# Patient Record
Sex: FEMALE | Race: WHITE | Employment: FULL TIME | URBAN - METROPOLITAN AREA
[De-identification: names, ages, dates, MRNs, and addresses within clinical notes are randomized per-mention and may not be internally consistent; named-entity substitution may affect disease eponyms.]

---

## 2017-07-24 ENCOUNTER — OFFICE VISIT (OUTPATIENT)
Dept: INTERNAL MEDICINE CLINIC | Age: 47
End: 2017-07-24

## 2017-07-24 VITALS
BODY MASS INDEX: 37.92 KG/M2 | WEIGHT: 256 LBS | TEMPERATURE: 98.6 F | DIASTOLIC BLOOD PRESSURE: 80 MMHG | SYSTOLIC BLOOD PRESSURE: 126 MMHG | RESPIRATION RATE: 18 BRPM | HEART RATE: 79 BPM | HEIGHT: 69 IN

## 2017-07-24 DIAGNOSIS — J04.0 ACUTE LARYNGITIS: Primary | ICD-10-CM

## 2017-07-24 DIAGNOSIS — J45.20 MILD INTERMITTENT ASTHMA WITHOUT COMPLICATION: ICD-10-CM

## 2017-07-24 DIAGNOSIS — E66.01 MORBID OBESITY DUE TO EXCESS CALORIES (HCC): ICD-10-CM

## 2017-07-24 PROBLEM — M15.9 PRIMARY OSTEOARTHRITIS INVOLVING MULTIPLE JOINTS: Status: ACTIVE | Noted: 2017-07-24

## 2017-07-24 RX ORDER — AZITHROMYCIN 250 MG/1
250 TABLET, FILM COATED ORAL SEE ADMIN INSTRUCTIONS
Qty: 6 TAB | Refills: 0 | Status: SHIPPED | OUTPATIENT
Start: 2017-07-24 | End: 2017-07-29

## 2017-07-24 RX ORDER — CLOBETASOL PROPIONATE 0.5 MG/G
CREAM TOPICAL
Refills: 1 | COMMUNITY
Start: 2017-06-13 | End: 2018-04-20 | Stop reason: SDUPTHER

## 2017-07-24 NOTE — PROGRESS NOTES
Subjective:   Jillian Chavez is a 51 y.o. female      Chief Complaint   Patient presents with    Sore Throat     started yesterday    Cold Symptoms        History of present illness: She presents complaining of some congestion that started about 2-3 days ago she has now developed some hoarseness over the last 24 hours. She denies any shortness of breath and there is minimal cough. She denies any fevers or chills. There are no other cardiovascular complaints other than her ears feel full. She denies any other complaints on complete review of systems. Patient Active Problem List   Diagnosis Code    Morbid obesity ([facility] E66.01    Asthma J45.909    Incontinence R32    Joint pain M25.50    Gastroesophageal reflux disease without esophagitis K21.9    Indigestion K30    Edema of extremities R60.0    Primary osteoarthritis involving multiple joints M15.0    Acute laryngitis J04.0      Past Medical History:   Diagnosis Date    Asthma     Edema of extremities 7/27/2010    Incontinence 7/27/2010    Indigestion 7/27/2010    Joint pain 7/27/2010    Morbid obesity ([facility] 7/27/2010    Reflux 7/27/2010      No Known Allergies   Family History   Problem Relation Age of Onset    Hypertension Mother    Gabe Prior Other Mother 76     aneurysm in her head    High Cholesterol Father     Heart Disease Father       Social History     Social History    Marital status:      Spouse name: N/A    Number of children: N/A    Years of education: N/A     Occupational History    Not on file. Social History Main Topics    Smoking status: Never Smoker    Smokeless tobacco: Never Used    Alcohol use Yes      Comment: socially    Drug use: No    Sexual activity: Yes     Partners: Male     Birth control/ protection: None     Other Topics Concern    Not on file     Social History Narrative     Prior to Admission medications    Medication Sig Start Date End Date Taking?  Authorizing Provider   clobetasol (Kat Meza) 0.05 % topical cream APPLY TOPICALLY BID PRN 6/13/17  Yes Historical Provider   azithromycin (ZITHROMAX) 250 mg tablet Take 1 Tab by mouth See Admin Instructions for 5 days. Take 2 tablets the first day, then 1 tablet daily for the next four days. 7/24/17 7/29/17 Yes Celia Turner MD   cetirizine (ZYRTEC) 10 mg tablet Take  by mouth as needed. 5/29/10  Yes Codey Nina MD   MULTIVITAMINS (MULTI-VITAMIN PO) Take  by mouth daily. 5/29/10  Yes Codey Nina MD   CALCIUM CARBONATE (CALCIUM 300 PO) Take 1 mg by mouth two (2) times a day. 5/29/10  Yes Codey Nina MD   cod liver oil Cap Take 1 Cap by mouth daily. Codey Nina MD        Review of Systems              Constitutional:  She denies fever, weight loss, sweats or fatigue. EYES: No blurred or double vision,               ENT: some nasal congestion, no headache or dizziness. No difficulty with swallowing, taste, speech or smell. Respiratory:  No cough, wheezing or shortness of breath. No sputum production. Cardiac:  Denies chest pain, palpitations, unexplained indigestion, syncope, edema, PND or orthopnea. GI:  No changes in bowel movements, no abdominal pain, no bloating, anorexia, nausea, vomiting or heartburn. :  No frequency or dysuria. Denies incontinence or sexual dysfunction. Extremities:  No joint pain, stiffness or swelling  Back:.no pain or soreness  Skin:  No recent rashes or mole changes. Neurological:  No numbness, tingling, burning paresthesias or loss of motor strength. No syncope, dizziness, frequent headaches or memory loss. Hematologic:  No easy bruising  Lymphatic: No lymph node enlargement    Objective:     Vitals:    07/24/17 1330   BP: 126/80   Pulse: 79   Resp: 18   Temp: 98.6 °F (37 °C)   TempSrc: Oral   Weight: 256 lb (116.1 kg)   Height: 5' 8.5\" (1.74 m)   PainSc:   2   LMP: 07/19/2017       Body mass index is 38.36 kg/(m^2).    Physical Examination:              General Appearance:  Well-developed, well-nourished, no acute  distress. HEENT:      Ears:  The TMs and ear canals were clear. Eyes:  The pupillary responses were normal.  Extraocular muscle function intact. Lids and conjunctiva not injected. Funduscopic exam revealed sharp disc margins. Nares: Clear w/o edema or erythema  Pharynx:  Clear with teeth in good repair. No masses were noted. Notable laryngitis with hoarseness    Neck:  Supple without thyromegaly or adenopathy. No JVD noted. No carotid bruits. Lungs:  Clear to auscultation and percussion. Cardiac:  Regular rate and rhythm without murmur. PMI not displaced. No gallop, rub or click. Abdominal: Soft, non-tender, no hepata-spleenomegally or masses  Extremities:  No clubbing, cyanosis or edema. Skin:  No rash or unusual mole changes noted. Lymph Nodes:  None felt in the cervical, supraclavicular, axillary or inguinal region. Neurological: . DTRs 2+ and symmetric. Station and gait normal.   Hematologic:   No purpura or petechiae        Assessment/Plan:         1. Acute laryngitis    2. Mild intermittent asthma without complication    3. Morbid obesity due to excess calories (HCC)        Impressions/Plan:  Acute upper respiratory infection with associated laryngitis. I do not see any evidence that her asthma is playing a role in this at the present time. I will put on a Z-Haroon to take as directed. She will notify me should he develop any wheezing shortness of breath problem with her asthma. I will recheck her otherwise at her previously scheduled appointment. Follow-up Disposition:  Return if symptoms worsen or fail to improve. No results found for any visits on 07/24/17. Meggan Chavez MD    The patient was given after the visit summary the patient verbalized an understanding of the plans and problems as explained.

## 2017-07-24 NOTE — PROGRESS NOTES
Patient started yesterday with sore throat, laryngitis and cough. Chief Complaint   Patient presents with    Sore Throat     started yesterday    Cold Symptoms     1. Have you been to the ER, urgent care clinic since your last visit? Hospitalized since your last visit? No    2. Have you seen or consulted any other health care providers outside of the [address] [place] [address] since your last visit? Include any pap smears or colon screening. Saw Dr. Jeffrey Lew for f/up of her lab band surgery.

## 2017-07-24 NOTE — MR AVS SNAPSHOT
Visit Information Date & Time Provider Department Dept. Phone Encounter #  
 7/24/2017  1:00 PM Gerri GarciaRajesh [phone] [id number] Upcoming Health Maintenance Date Due Pneumococcal 19-64 Medium Risk (1 of 1 - PPSV23) 8/6/1988 DTaP/Tdap/Td series (1 - Tdap) 8/6/1990 PAP AKA CERVICAL CYTOLOGY 8/6/1990 INFLUENZA AGE 9 TO ADULT 8/1/2017 Allergies as of 7/24/2017  Review Complete On: 7/24/2017 By: Gerri Garcia MD  
 No Known Allergies Current Immunizations  Never Reviewed No immunizations on file. Not reviewed this visit You Were Diagnosed With   
  
 Codes Comments Mild intermittent asthma without complication    -  Primary ICD-10-CM: J45.20 ICD-9-CM: 493.90 Morbid obesity due to excess calories (HCC)     ICD-10-CM: E66.01 
ICD-9-CM: 278.01 Acute laryngitis     ICD-10-CM: J04.0 ICD-9-CM: 464.00 Vitals BP Pulse Temp Resp Height(growth percentile) Weight(growth percentile) 126/80 (BP 1 Location: Left arm, BP Patient Position: Supine) 79 98.6 °F (37 °C) (Oral) 18 5' 8.5\" (1.74 m) 256 lb (116.1 kg) LMP BMI Smoking Status 07/19/2017 (Exact Date) 38.36 kg/m2 Never Smoker BMI and BSA Data Body Mass Index Body Surface Area  
 38.36 kg/m 2 2.37 m 2 Preferred Pharmacy Pharmacy Name Phone [place] [phone], [address] AT [place] [address] [phone] Your Updated Medication List  
  
   
This list is accurate as of: 7/24/17  1:42 PM.  Always use your most recent med list.  
  
  
  
  
 CALCIUM 300 PO Take 1 mg by mouth two (2) times a day. clobetasol 0.05 % topical cream  
Commonly known as:  TEMOVATE  
APPLY TOPICALLY BID PRN  
  
 cod liver oil Cap Take 1 Cap by mouth daily. MULTI-VITAMIN PO Take  by mouth daily. ZyrTEC 10 mg tablet Generic drug:  cetirizine Take  by mouth as needed. Introducing [facility]! Jyoti Love introduces [employer] patient portal. Now you can access parts of your medical record, email your doctor's office, and request medication refills online. 1. In your internet browser, go to [email or web address] 2. Click on the First Time User? Click Here link in the Sign In box. You will see the New Member Sign Up page. 3. Enter your [employer] Access Code exactly as it appears below. You will not need to use this code after youve completed the sign-up process. If you do not sign up before the expiration date, you must request a new code. · [employer] Access Code: [email or web address] Expires: 10/22/2017 12:59 PM 
 
4. Enter the last four digits of your Social Security Number (xxxx) and Date of Birth (mm/dd/yyyy) as indicated and click Submit. You will be taken to the next sign-up page. 5. Create a [employer] ID. This will be your [employer] login ID and cannot be changed, so think of one that is secure and easy to remember. 6. Create a [employer] password. You can change your password at any time. 7. Enter your Password Reset Question and Answer. This can be used at a later time if you forget your password. 8. Enter your e-mail address. You will receive e-mail notification when new information is available in [address] 9. Click Sign Up. You can now view and download portions of your medical record. 10. Click the Download Summary menu link to download a portable copy of your medical information. If you have questions, please visit the Frequently Asked Questions section of the [employer] website. Remember, [employer] is NOT to be used for urgent needs. For medical emergencies, dial 911. Now available from your iPhone and Android! Please provide this summary of care documentation to your next provider. Your primary care clinician is listed as Emilie.  If you have any questions after today's visit, please call [phone].

## 2017-11-30 ENCOUNTER — TELEPHONE (OUTPATIENT)
Dept: INTERNAL MEDICINE CLINIC | Age: 47
End: 2017-11-30

## 2017-11-30 ENCOUNTER — OFFICE VISIT (OUTPATIENT)
Dept: INTERNAL MEDICINE CLINIC | Age: 47
End: 2017-11-30

## 2017-11-30 VITALS
WEIGHT: 258.8 LBS | HEART RATE: 77 BPM | RESPIRATION RATE: 20 BRPM | HEIGHT: 69 IN | SYSTOLIC BLOOD PRESSURE: 110 MMHG | BODY MASS INDEX: 38.33 KG/M2 | OXYGEN SATURATION: 98 % | TEMPERATURE: 98.8 F | DIASTOLIC BLOOD PRESSURE: 74 MMHG

## 2017-11-30 DIAGNOSIS — J20.9 ACUTE BRONCHITIS, UNSPECIFIED ORGANISM: Primary | ICD-10-CM

## 2017-11-30 DIAGNOSIS — J06.9 ACUTE URI: Primary | ICD-10-CM

## 2017-11-30 RX ORDER — AZITHROMYCIN 250 MG/1
250 TABLET, FILM COATED ORAL SEE ADMIN INSTRUCTIONS
Qty: 6 TAB | Refills: 0 | Status: SHIPPED | OUTPATIENT
Start: 2017-11-30 | End: 2017-12-05

## 2017-11-30 NOTE — TELEPHONE ENCOUNTER
Patient called back to request a refill of her inhaler. Says she forgot to ask at the visit today. Reviewed old rx's and patient does have this on her list in the old EMR system. Rx called to patient's pharmacy.

## 2017-11-30 NOTE — PROGRESS NOTES
1. Have you been to the ER, urgent care clinic since your last visit? Hospitalized since your last visit? No    2. Have you seen or consulted any other health care providers outside of the [address] [place] [address] [place] since your last visit? Include any pap smears or colon screening. Yes, [facility], Dr. Vicky Arechiga, on 09- , for nutrition and weight loss. Also saw Dr. Candy Card around [address] [place] [address] for follow up of LAP Band Procedure. Chief Complaint   Patient presents with    Cold Symptoms     cough, congestion     Fasting    Pap ans mammogram were done April 2017 with [facility]. Eye exam was done May 2017 with Dr. Lizzette Alarcon.

## 2017-11-30 NOTE — PROGRESS NOTES
Subjective:   Shyanne Bland is a 49 y.o. female      Chief Complaint   Patient presents with    Cold Symptoms     cough, congestion        History of present illness: She presents complaining a lot of head and nasal congestion she started with earache and now notes a lot of postnasal drainage and resultant cough. She is noted no fevers or chills she does have some joint aches but no severe myalgias. There have been as noted no fevers or chills. She has no other complaints on complete review. Patient Active Problem List   Diagnosis Code    Morbid obesity ([facility] E66.01    Asthma J45.909    Incontinence R32    Joint pain M25.50    Gastroesophageal reflux disease without esophagitis K21.9    Indigestion K30    Edema of extremities R60.0    Primary osteoarthritis involving multiple joints M15.0    Acute laryngitis J04.0    Acute URI J06.9      Past Medical History:   Diagnosis Date    Asthma     Edema of extremities 7/27/2010    Incontinence 7/27/2010    Indigestion 7/27/2010    Joint pain 7/27/2010    Morbid obesity ([facility] 7/27/2010    Reflux 7/27/2010      No Known Allergies   Family History   Problem Relation Age of Onset    Hypertension Mother    [place] Other Mother 76     aneurysm in her head    High Cholesterol Father     Heart Disease Father       Social History     Social History    Marital status:      Spouse name: N/A    Number of children: N/A    Years of education: N/A     Occupational History    Not on file. Social History Main Topics    Smoking status: Never Smoker    Smokeless tobacco: Never Used    Alcohol use Yes      Comment: socially    Drug use: No    Sexual activity: Yes     Partners: Male     Birth control/ protection: None     Other Topics Concern    Not on file     Social History Narrative     Prior to Admission medications    Medication Sig Start Date End Date Taking?  Authorizing Provider   azithromycin (ZITHROMAX) 250 mg tablet Take 1 Tab by mouth See Admin Instructions for 5 days. Take 2 tablets the first day, then 1 tablet daily for the next four days. 11/30/17 12/5/17 Yes Delmi Lin MD   clobetasol (TEMOVATE) 0.05 % topical cream APPLY TOPICALLY BID PRN 6/13/17  Yes Historical Provider   cetirizine (ZYRTEC) 10 mg tablet Take  by mouth as needed. 5/29/10  Yes Phys Other, MD   MULTIVITAMINS (MULTI-VITAMIN PO) Take  by mouth daily. 5/29/10  Yes Phys Other, MD        Review of Systems              Constitutional:  She denies fever, weight loss, sweats or fatigue. EYES: No blurred or double vision,               ENT: Positive ahead and nasal congestion, no headache or dizziness. No difficulty with               swallowing, taste, speech or smell. Respiratory: Positive for postnasal drainage with cough without wheezing or shortness of breath. No sputum production. Cardiac:  Denies chest pain, palpitations, unexplained indigestion, syncope, edema, PND or orthopnea. GI:  No changes in bowel movements, no abdominal pain, no bloating, anorexia, nausea, vomiting or heartburn. :  No frequency or dysuria. Denies incontinence or sexual dysfunction. Extremities:  No joint pain, stiffness or swelling  Back:.no pain or soreness  Skin:  No recent rashes or mole changes. Neurological:  No numbness, tingling, burning paresthesias or loss of motor strength. No syncope, dizziness, frequent headaches or memory loss. Hematologic:  No easy bruising  Lymphatic: No lymph node enlargement    Objective:     Vitals:    11/30/17 1359   BP: 110/74   Pulse: 77   Resp: 20   Temp: 98.8 °F (37.1 °C)   TempSrc: Oral   SpO2: 98%   Weight: 258 lb 12.8 oz (117.4 kg)   Height: 5' 8.5\" (1.74 m)   PainSc:   0 - No pain       Body mass index is 38.78 kg/(m^2). Physical Examination:              General Appearance:  Well-developed, well-nourished, no acute distress. HEENT:      Ears:  The TMs and ear canals were clear.   Eyes:  The pupillary responses were normal.  Extraocular muscle function intact. Lids and conjunctiva not injected. Funduscopic exam revealed sharp disc margins. Nares: Inflamed and edematous  Pharynx:  Clear with teeth in good repair. No masses were noted. Neck:  Supple without thyromegaly or adenopathy. No JVD noted. No carotid                bruits. Lungs:  Clear to auscultation and percussion. Cardiac:  Regular rate and rhythm without murmur. PMI not displaced. No gallop, rub or click. Abdominal: Soft, non-tender, no hepata-spleenomegally or masses  Extremities:  No clubbing, cyanosis or edema. Skin:  No rash or unusual mole changes noted. Lymph Nodes:  None felt in the cervical, supraclavicular, axillary or inguinal region. Neurological: . DTRs 2+ and symmetric. Station and gait normal.   Hematologic:   No purpura or petechiae        Assessment/Plan:         1. Acute URI        Impressions/Plan:  Acute URI/sinusitis we will treat this with a Z-Haroon to take as directed she will notify me if it is not effective. Orders Placed This Encounter    azithromycin (ZITHROMAX) 250 mg tablet       Follow-up Disposition:  Return if symptoms worsen or fail to improve. No results found for any visits on 11/30/17. Britni Pryor MD    The patient was given after the visit summary the patient verbalized an understanding of the plans and problems as explained.

## 2017-11-30 NOTE — TELEPHONE ENCOUNTER
Requested Prescriptions     Pending Prescriptions Disp Refills    albuterol (PROVENTIL HFA, VENTOLIN HFA, PROAIR HFA) 90 mcg/actuation inhaler 1 Inhaler 1     Sig: Take 2 Puffs by inhalation every six (6) hours as needed for Wheezing.

## 2017-11-30 NOTE — MR AVS SNAPSHOT
Visit Information Date & Time Provider Department Dept. Phone Encounter #  
 11/30/2017  1:50 PM Salas Garcia MD [facility] [id number] Follow-up Instructions Return if symptoms worsen or fail to improve. Follow-up and Disposition History Your Appointments 4/6/2018  8:00 AM  
Follow Up with MD Adams Sarmientocharisma [address] ([facility]) Appt Note: [place] [address] [phone] [address] [facility] [phone] Upcoming Health Maintenance Date Due Pneumococcal 19-64 Medium Risk (1 of 1 - PPSV23) 8/6/1988 DTaP/Tdap/Td series (1 - Tdap) 8/6/1990 PAP AKA CERVICAL CYTOLOGY 8/6/1990 BREAST CANCER SCRN MAMMOGRAM 1/29/2017 Allergies as of 11/30/2017  Review Complete On: 11/30/2017 By: Salas Garcia MD  
 No Known Allergies Current Immunizations  Never Reviewed No immunizations on file. Not reviewed this visit You Were Diagnosed With   
  
 Codes Comments Acute URI    -  Primary ICD-10-CM: J06.9 ICD-9-CM: 465.9 Vitals BP Pulse Temp Resp Height(growth percentile) Weight(growth percentile) 110/74 (BP 1 Location: Left arm, BP Patient Position: Sitting) 77 98.8 °F (37.1 °C) (Oral) 20 5' 8.5\" (1.74 m) 258 lb 12.8 oz (117.4 kg) SpO2 BMI OB Status Smoking Status 98% 38.78 kg/m2 Premenopausal Never Smoker Vitals History BMI and BSA Data Body Mass Index Body Surface Area 38.78 kg/m 2 2.38 m 2 Preferred Pharmacy Pharmacy Name Phone [phone], [address] AT [phone] Your Updated Medication List  
  
   
This list is accurate as of: 11/30/17  2:49 PM.  Always use your most recent med list.  
  
  
  
  
 azithromycin 250 mg tablet Commonly known as:  Hedwig Pine Take 1 Tab by mouth See Admin Instructions for 5 days. Take 2 tablets the first day, then 1 tablet daily for the next four days. clobetasol 0.05 % topical cream  
Commonly known as:  TEMOVATE  
APPLY TOPICALLY BID PRN  
  
 MULTI-VITAMIN PO Take  by mouth daily. ZyrTEC 10 mg tablet Generic drug:  cetirizine Take  by mouth as needed. Prescriptions Sent to Pharmacy Refills  
 azithromycin (ZITHROMAX) 250 mg tablet 0 Sig: Take 1 Tab by mouth See Admin Instructions for 5 days. Take 2 tablets the first day, then 1 tablet daily for the next four days. Class: Normal  
 Pharmacy: [facility] [address] [place] [address] #: [phone] Route: Oral  
  
Follow-up Instructions Return if symptoms worsen or fail to improve. Introducing [facility]! [facility] introduces [employer] patient portal. Now you can access parts of your medical record, email your doctor's office, and request medication refills online. 1. In your internet browser, go to [email or web address] 2. Click on the First Time User? Click Here link in the Sign In box. You will see the New Member Sign Up page. 3. Enter your [employer] Access Code exactly as it appears below. You will not need to use this code after youve completed the sign-up process. If you do not sign up before the expiration date, you must request a new code. · [employer] Access Code: [email or web address] Expires: 2/28/2018  1:52 PM 
 
4. Enter the last four digits of your Social Security Number (xxxx) and Date of Birth (mm/dd/yyyy) as indicated and click Submit. You will be taken to the next sign-up page. 5. Create a [employer] ID. This will be your [employer] login ID and cannot be changed, so think of one that is secure and easy to remember. 6. Create a [employer] password. You can change your password at any time. 7. Enter your Password Reset Question and Answer. This can be used at a later time if you forget your password. 8. Enter your e-mail address. You will receive e-mail notification when new information is available in [address] 9. Click Sign Up. You can now view and download portions of your medical record. 10. Click the Download Summary menu link to download a portable copy of your medical information. If you have questions, please visit the Frequently Asked Questions section of the [employer] website. Remember, [employer] is NOT to be used for urgent needs. For medical emergencies, dial 911. Now available from your iPhone and Android! Please provide this summary of care documentation to your next provider. Your primary care clinician is listed as Emilie. If you have any questions after today's visit, please call [phone].

## 2017-12-01 RX ORDER — ALBUTEROL SULFATE 90 UG/1
2 AEROSOL, METERED RESPIRATORY (INHALATION)
Qty: 1 INHALER | Refills: 1 | Status: SHIPPED | OUTPATIENT
Start: 2017-12-01 | End: 2019-01-02 | Stop reason: ALTCHOICE

## 2018-03-05 ENCOUNTER — OFFICE VISIT (OUTPATIENT)
Dept: INTERNAL MEDICINE CLINIC | Age: 48
End: 2018-03-05

## 2018-03-05 VITALS
TEMPERATURE: 98.6 F | RESPIRATION RATE: 18 BRPM | DIASTOLIC BLOOD PRESSURE: 82 MMHG | HEART RATE: 81 BPM | WEIGHT: 265.2 LBS | OXYGEN SATURATION: 98 % | SYSTOLIC BLOOD PRESSURE: 139 MMHG | BODY MASS INDEX: 39.28 KG/M2 | HEIGHT: 69 IN

## 2018-03-05 DIAGNOSIS — J01.00 ACUTE NON-RECURRENT MAXILLARY SINUSITIS: Primary | ICD-10-CM

## 2018-03-05 DIAGNOSIS — J45.20 MILD INTERMITTENT ASTHMA WITHOUT COMPLICATION: ICD-10-CM

## 2018-03-05 DIAGNOSIS — E66.01 MORBID OBESITY (HCC): ICD-10-CM

## 2018-03-05 RX ORDER — GUAIFENESIN 600 MG/1
600 TABLET, EXTENDED RELEASE ORAL
COMMUNITY
End: 2018-04-06 | Stop reason: ALTCHOICE

## 2018-03-05 RX ORDER — CEFUROXIME AXETIL 250 MG/1
250 TABLET ORAL 2 TIMES DAILY
Qty: 20 TAB | Refills: 0 | Status: SHIPPED | OUTPATIENT
Start: 2018-03-05 | End: 2018-04-06 | Stop reason: ALTCHOICE

## 2018-03-05 NOTE — PROGRESS NOTES
Chief Complaint   Patient presents with    Cold Symptoms     Nasal congestion clear to yellow in color, Symptoms started on Friday    Cough     Productive cough    Headache     headache & sinus pressure     Visit Vitals    /82 (BP 1 Location: Left arm, BP Patient Position: Sitting)    Pulse 81    Temp 98.6 °F (37 °C) (Oral)    Resp 18    Ht 5' 8.5\" (1.74 m)    Wt 265 lb 3.2 oz (120.3 kg)    SpO2 98%    BMI 39.74 kg/m2     1. Have you been to the ER, urgent care clinic since your last visit? Hospitalized since your last visit? No    2. Have you seen or consulted any other health care providers outside of the [address] [place] [address] since your last visit? Include any pap smears or colon screening.  No

## 2018-03-05 NOTE — PATIENT INSTRUCTIONS
Sinusitis: Care Instructions  Your Care Instructions    Sinusitis is an infection of the lining of the sinus cavities in your head. Sinusitis often follows a cold. It causes pain and pressure in your head and face. In most cases, sinusitis gets better on its own in 1 to 2 weeks. But some mild symptoms may last for several weeks. Sometimes antibiotics are needed. Follow-up care is a key part of your treatment and safety. Be sure to make and go to all appointments, and call your doctor if you are having problems. It's also a good idea to know your test results and keep a list of the medicines you take. How can you care for yourself at home? · Take an over-the-counter pain medicine, such as acetaminophen (Tylenol), ibuprofen (Advil, Motrin), or naproxen (Aleve). Read and follow all instructions on the label. · If the doctor prescribed antibiotics, take them as directed. Do not stop taking them just because you feel better. You need to take the full course of antibiotics. · Be careful when taking over-the-counter cold or flu medicines and Tylenol at the same time. Many of these medicines have acetaminophen, which is Tylenol. Read the labels to make sure that you are not taking more than the recommended dose. Too much acetaminophen (Tylenol) can be harmful. · Breathe warm, moist air from a steamy shower, a hot bath, or a sink filled with hot water. Avoid cold, dry air. Using a humidifier in your home may help. Follow the directions for cleaning the machine. · Use saline (saltwater) nasal washes to help keep your nasal passages open and wash out mucus and bacteria. You can buy saline nose drops at a grocery store or drugstore. Or you can make your own at home by adding 1 teaspoon of salt and 1 teaspoon of baking soda to 2 cups of distilled water. If you make your own, fill a bulb syringe with the solution, insert the tip into your nostril, and squeeze gently. [place] your nose.   · Put a hot, wet towel or a warm gel pack on your face 3 or 4 times a day for 5 to 10 minutes each time. · Try a decongestant nasal spray like oxymetazoline (Afrin). Do not use it for more than 3 days in a row. Using it for more than 3 days can make your congestion worse. When should you call for help? Call your doctor now or seek immediate medical care if:  ? · You have new or worse swelling or redness in your face or around your eyes. ? · You have a new or higher fever. ? Watch closely for changes in your health, and be sure to contact your doctor if:  ? · You have new or worse facial pain. ? · The mucus from your nose becomes thicker (like pus) or has new blood in it. ? · You are not getting better as expected. Where can you learn more? Go to http://ravindra-bobby.info/. Enter I596 in the search box to learn more about \"Sinusitis: Care Instructions. \"  Current as of: May 12, 2017  Content Version: 11.4  © 9426-9925 [employer]. Care instructions adapted under license by [employer] (which disclaims liability or warranty for this information). If you have questions about a medical condition or this instruction, always ask your healthcare professional. [address] any warranty or liability for your use of this information.

## 2018-03-05 NOTE — PROGRESS NOTES
Subjective:   Lana Zabala is a 49 y.o. female      Chief Complaint   Patient presents with    Cold Symptoms     Nasal congestion clear to yellow in color, Symptoms started on Friday    Cough     Productive cough    Headache     headache & sinus pressure        History of present illness: She presents complaint a lot of sinus congestion or pressure around the eyes particularly on the left side. Been present now for about 3-4 days. She notes no fevers or chills. She notes no shortness of breath or exacerbation of asthma. She has no other complaints. Patient Active Problem List   Diagnosis Code    Morbid obesity ([facility] [address] E66.01    Asthma J45.909    Incontinence R32    Joint pain M25.50    Gastroesophageal reflux disease without esophagitis K21.9    Indigestion K30    Edema of extremities R60.0    Primary osteoarthritis involving multiple joints M15.0    Acute laryngitis J04.0    Acute URI J06.9    Acute non-recurrent maxillary sinusitis J01.00      Past Medical History:   Diagnosis Date    Asthma     Edema of extremities 7/27/2010    Incontinence 7/27/2010    Indigestion 7/27/2010    Joint pain 7/27/2010    Morbid obesity ([facility] [address] 7/27/2010    Reflux 7/27/2010      No Known Allergies   Family History   Problem Relation Age of Onset    Hypertension Mother    Stacey Morrison Other Mother 76     aneurysm in her head    High Cholesterol Father     Heart Disease Father       Social History     Social History    Marital status:      Spouse name: N/A    Number of children: N/A    Years of education: N/A     Occupational History    Not on file.      Social History Main Topics    Smoking status: Never Smoker    Smokeless tobacco: Never Used    Alcohol use Yes      Comment: socially    Drug use: No    Sexual activity: Yes     Partners: Male     Birth control/ protection: None     Other Topics Concern    Not on file     Social History Narrative     Prior to Admission medications    Medication Sig Start Date End Date Taking? Authorizing Provider   PSEUDOEPHEDRINE HCL (WAL-PHED D PO) Take  by mouth. Yes Historical Provider   guaiFENesin ER (MUCINEX) 600 mg ER tablet Take 600 mg by mouth two (2) times a day. Yes Historical Provider   cefUROXime (CEFTIN) 250 mg tablet Take 1 Tab by mouth two (2) times a day. 3/5/18  Yes Fitz Frey MD   albuterol (PROVENTIL HFA, VENTOLIN HFA, PROAIR HFA) 90 mcg/actuation inhaler Take 2 Puffs by inhalation every six (6) hours as needed for Wheezing. 12/1/17  Yes Fitz Frey MD   clobetasol (TEMOVATE) 0.05 % topical cream APPLY TOPICALLY BID PRN 6/13/17  Yes Historical Provider   cetirizine (ZYRTEC) 10 mg tablet Take  by mouth as needed. 5/29/10  Yes Codey Nina MD   MULTIVITAMINS (MULTI-VITAMIN PO) Take  by mouth daily. 5/29/10  Yes Codey Nina MD        Review of Systems              Constitutional:  She denies fever, weight loss, sweats or fatigue. EYES: No blurred or double vision,               ENT: Positive ahead and nasal congestion, no headache or dizziness. No difficulty with               swallowing, taste, speech or smell. Respiratory:  No cough, wheezing or shortness of breath. No sputum production. Cardiac:  Denies chest pain, palpitations, unexplained indigestion, syncope, edema, PND or orthopnea. GI:  No changes in bowel movements, no abdominal pain, no bloating, anorexia, nausea, vomiting or heartburn. :  No frequency or dysuria. Denies incontinence or sexual dysfunction. Extremities:  No joint pain, stiffness or swelling  Back:.no pain or soreness  Skin:  No recent rashes or mole changes. Neurological:  No numbness, tingling, burning paresthesias or loss of motor strength. No syncope, dizziness, frequent headaches or memory loss.   Hematologic:  No easy bruising  Lymphatic: No lymph node enlargement    Objective:     Vitals:    03/05/18 1549   BP: 139/82   Pulse: 81   Resp: 18   Temp: 98.6 °F (37 °C)   TempSrc: Oral SpO2: 98%   Weight: 265 lb 3.2 oz (120.3 kg)   Height: 5' 8.5\" (1.74 m)   PainSc:   0 - No pain   LMP: 02/13/2018       Body mass index is 39.74 kg/(m^2). Physical Examination:              General Appearance:  Well-developed, well-nourished, no acute distress. HEENT:      Ears:  The TMs and ear canals were clear. Eyes:  The pupillary responses were normal.  Extraocular muscle function intact. Lids and conjunctiva not injected. Funduscopic exam revealed sharp disc margins. Nares: Nares inflamed and edematous  Pharynx:  Clear with teeth in good repair. No masses were noted. Neck:  Supple without thyromegaly or adenopathy. No JVD noted. No carotid                bruits. Lungs:  Clear to auscultation and percussion. Cardiac:  Regular rate and rhythm without murmur. PMI not displaced. No gallop, rub or click. Abdominal: Soft, non-tender, no hepata-spleenomegally or masses  Extremities:  No clubbing, cyanosis or edema. Skin:  No rash or unusual mole changes noted. Lymph Nodes:  None felt in the cervical, supraclavicular, axillary or inguinal region. Neurological: . DTRs 2+ and symmetric. Station and gait normal.   Hematologic:   No purpura or petechiae        Assessment/Plan:         1. Acute non-recurrent maxillary sinusitis    2. Mild intermittent asthma without complication    3. Morbid obesity ([address]        Impressions/Plan:  Impression  1. Acute sinusitis we will treat this with Ceftin twice a day for 10 days  2. Asthma does not seem to be affected by this at the present time  3. Morbid obesity discussed diet exercise weight reduction  Recheck as previously scheduled    Orders Placed This Encounter    PSEUDOEPHEDRINE HCL (WAL-PHED D PO)    guaiFENesin ER (MUCINEX) 600 mg ER tablet    cefUROXime (CEFTIN) 250 mg tablet       Follow-up Disposition:  Return for At previously scheduled appointment. No results found for any visits on 03/05/18.       Chad Brunner MD    The patient was given after the visit summary the patient verbalized an understanding of the plans and problems as explained.

## 2018-04-05 PROBLEM — J45.20 MILD INTERMITTENT ASTHMA WITHOUT COMPLICATION: Status: ACTIVE | Noted: 2018-04-05

## 2018-04-06 ENCOUNTER — OFFICE VISIT (OUTPATIENT)
Dept: INTERNAL MEDICINE CLINIC | Age: 48
End: 2018-04-06

## 2018-04-06 VITALS
BODY MASS INDEX: 39.55 KG/M2 | DIASTOLIC BLOOD PRESSURE: 82 MMHG | WEIGHT: 267 LBS | SYSTOLIC BLOOD PRESSURE: 120 MMHG | OXYGEN SATURATION: 98 % | HEIGHT: 69 IN | TEMPERATURE: 98.5 F | RESPIRATION RATE: 16 BRPM | HEART RATE: 62 BPM

## 2018-04-06 DIAGNOSIS — E66.01 MORBID OBESITY (HCC): ICD-10-CM

## 2018-04-06 DIAGNOSIS — J45.20 MILD INTERMITTENT ASTHMA WITHOUT COMPLICATION: ICD-10-CM

## 2018-04-06 DIAGNOSIS — K21.9 GASTROESOPHAGEAL REFLUX DISEASE WITHOUT ESOPHAGITIS: ICD-10-CM

## 2018-04-06 DIAGNOSIS — Z00.00 ANNUAL PHYSICAL EXAM: Primary | ICD-10-CM

## 2018-04-06 DIAGNOSIS — M15.9 PRIMARY OSTEOARTHRITIS INVOLVING MULTIPLE JOINTS: ICD-10-CM

## 2018-04-06 DIAGNOSIS — G47.9 SLEEP DISTURBANCE: ICD-10-CM

## 2018-04-06 LAB
ALBUMIN SERPL-MCNC: 4.1 G/DL (ref 3.9–5.4)
ALKALINE PHOS POC: 63 U/L (ref 38–126)
ALT SERPL-CCNC: 28 U/L (ref 9–52)
AST SERPL-CCNC: 19 U/L (ref 14–36)
BUN BLD-MCNC: 12 MG/DL (ref 7–17)
CALCIUM BLD-MCNC: 9.2 MG/DL (ref 8.4–10.2)
CHLORIDE BLD-SCNC: 103 MMOL/L (ref 98–107)
CO2 POC: 27 MMOL/L (ref 22–32)
CREAT BLD-MCNC: 0.8 MG/DL (ref 0.7–1.2)
EGFR (POC): 87.2
GLUCOSE POC: 92 MG/DL (ref 65–105)
POTASSIUM SERPL-SCNC: 4.1 MMOL/L (ref 3.6–5)
PROT SERPL-MCNC: 7 G/DL (ref 6.3–8.2)
SODIUM SERPL-SCNC: 139 MMOL/L (ref 137–145)
TOTAL BILIRUBIN POC: 1 MG/DL (ref 0.2–1.3)

## 2018-04-06 NOTE — MR AVS SNAPSHOT
[facility], [place], operated by [facility] 
 
 
 [place] [address] [phone] Patient: Ford Garza MRN: [id number] :1970 Visit Information Date & Time Provider Department Dept. Phone Encounter #  
 2018  8:00 AM Meggan Chavez MD [place] [address] [phone] [id number] Follow-up Instructions Return in about 6 months (around 10/6/2018). Your Appointments 5/3/2019  8:00 AM  
FOLLOW UP 10 with MD ALVINO Cortes [facility] ([address]) Appt Note: follow up yearly; follow up yearly [place] [id number] [address] [facility] [address] Upcoming Health Maintenance Date Due Pneumococcal 19-64 Medium Risk (1 of 1 - PPSV23) 1988 DTaP/Tdap/Td series (1 - Tdap) 1990 PAP AKA CERVICAL CYTOLOGY 1990 BREAST CANCER SCRN MAMMOGRAM 2017 Allergies as of 2018  Review Complete On: 2018 By: Meggan Chavez MD  
 No Known Allergies Current Immunizations  Never Reviewed No immunizations on file. Not reviewed this visit You Were Diagnosed With   
  
 Codes Comments Annual physical exam    -  Primary ICD-10-CM: Z00.00 ICD-9-CM: V70.0 Gastroesophageal reflux disease without esophagitis     ICD-10-CM: K21.9 ICD-9-CM: 530.81 Morbid obesity ([facility] [address]     ICD-10-CM: E66.01 
ICD-9-CM: 278.01 Primary osteoarthritis involving multiple joints     ICD-10-CM: M15.0 ICD-9-CM: 715.09 Mild intermittent asthma without complication     [facility]-77-JA: J45.20 ICD-9-CM: 493.90 Sleep disturbance     ICD-10-CM: G47.9 ICD-9-CM: 780.50 Vitals BP Pulse Temp Resp Height(growth percentile) Weight(growth percentile)  120/82 (BP 1 Location: Left arm, BP Patient Position: Sitting) 62 98.5 °F (36.9 °C) (Oral) 16 5' 8.5\" (1.74 m) 267 lb (121.1 kg) LMP SpO2 BMI OB Status Smoking Status 04/04/2018 98% 40.01 kg/m2 Premenopausal Never Smoker Vitals History BMI and BSA Data Body Mass Index Body Surface Area 40.01 kg/m 2 2.42 m 2 Preferred Pharmacy Pharmacy Name Phone [phone], [address] AT [place] [address] [phone] Your Updated Medication List  
  
   
This list is accurate as of 4/6/18  8:55 AM.  Always use your most recent med list.  
  
  
  
  
 albuterol 90 mcg/actuation inhaler Commonly known as:  PROVENTIL HFA, VENTOLIN HFA, PROAIR HFA Take 2 Puffs by inhalation every six (6) hours as needed for Wheezing. clobetasol 0.05 % topical cream  
Commonly known as:  TEMOVATE  
APPLY TOPICALLY BID PRN  
  
 MULTI-VITAMIN PO Take  by mouth daily. ZyrTEC 10 mg tablet Generic drug:  cetirizine Take  by mouth as needed. We Performed the Following AMB POC COMPLETE CBC,AUTOMATED ENTER [id number] CPT(R)] AMB POC COMPREHENSIVE METABOLIC PANEL [47905 CPT(R)] AMB POC LIPID PROFILE [57271 CPT(R)] AMB POC T4, FREE [id number] CPT(R)] AMB POC TSH [18791 CPT(R)] AMB POC URINALYSIS DIP STICK AUTO W/ MICRO  [00285 CPT(R)] REFERRAL TO SLEEP STUDIES [REF99 Custom] Comments:  
 Accusom home sleep test  
  
Follow-up Instructions Return in about 6 months (around 10/6/2018). Referral Information Referral ID Referred By Referred To  
  
 [id number] Espinoza Chavez Not Available Visits Status Start Date End Date 1 New Request 4/6/18 4/6/19 If your referral has a status of pending review or denied, additional information will be sent to support the outcome of this decision. Patient Instructions Asthma Attack: Care Instructions Your Care Instructions During an asthma attack, the airways swell and narrow. This makes it hard to breathe. Severe asthma attacks can be life-threatening, but you can help prevent them by keeping your asthma under control and treating symptoms before they get bad. Symptoms include being short of breath, having chest tightness, coughing, and wheezing. Noting and treating these symptoms can also help you avoid future trips to the emergency room. The doctor has checked you carefully, but problems can develop later. If you notice any problems or new symptoms, get medical treatment right away. Follow-up care is a key part of your treatment and safety. Be sure to make and go to all appointments, and call your doctor if you are having problems. It's also a good idea to know your test results and keep a list of the medicines you take. How can you care for yourself at home? · Follow your asthma action plan to prevent and treat attacks. If you don't have an asthma action plan, work with your doctor to create one. · Take your asthma medicines exactly as prescribed. Talk to your doctor right away if you have any questions about how to take them. ¨ Use your quick-relief medicine when you have symptoms of an attack. Quick-relief medicine is usually an albuterol inhaler. Some people need to use quick-relief medicine before they exercise. ¨ Take your controller medicine every day, not just when you have symptoms. Controller medicine is usually an inhaled corticosteroid. The goal is to prevent problems before they occur. Don't use your controller medicine to treat an attack that has already started. It doesn't work fast enough to help. ¨ If your doctor prescribed corticosteroid pills to use during an attack, take them exactly as prescribed. It may take hours for the pills to work, but they may make the episode shorter and help you breathe better. ¨ Keep your quick-relief medicine with you at all times. · Talk to your doctor before using other medicines. Some medicines, such as aspirin, can cause asthma attacks in some people. · If you have a peak flow meter, use it to check how well you are breathing. This can help you predict when an asthma attack is going to occur. Then you can take medicine to prevent the asthma attack or make it less severe. · Do not smoke or allow others to smoke around you. Avoid smoky places. Smoking makes asthma worse. If you need help quitting, talk to your doctor about stop-smoking programs and medicines. These can increase your chances of quitting for good. · Learn what triggers an asthma attack for you, and avoid the triggers when you can. Common triggers include colds, smoke, air pollution, dust, pollen, mold, pets, cockroaches, stress, and cold air. · Avoid colds and the flu. Get a pneumococcal vaccine shot. If you have had one before, ask your doctor if you need a second dose. Get a flu vaccine every fall. If you must be around people with colds or the flu, wash your hands often. When should you call for help? Call 911 anytime you think you may need emergency care. For example, call if: 
? · You have severe trouble breathing. ?Call your doctor now or seek immediate medical care if: 
? · Your symptoms do not get better after you have followed your asthma action plan. ? · You have new or worse trouble breathing. ? · Your coughing and wheezing get worse. ? · You cough up dark brown or bloody mucus (sputum). ? · You have a new or higher fever. ? Watch closely for changes in your health, and be sure to contact your doctor if: 
? · You need to use quick-relief medicine on more than 2 days a week (unless it is just for exercise). ? · You cough more deeply or more often, especially if you notice more mucus or a change in the color of your mucus. ? · You are not getting better as expected. Where can you learn more? Go to http://ravindra-bobby.info/. Enter U946 in the search box to learn more about \"Asthma Attack: Care Instructions. \" Current as of: May 12, 2017 Content Version: 11.4 © 9287-6167 [employer]wise[employer]. Care instructions adapted under license by [employer] (which disclaims liability or warranty for this information). If you have questions about a medical condition or this instruction, always ask your healthcare professional. [address] any warranty or liability for your use of this information. Introducing [facility]! [employer] introduces Industry Dive patient portal. Now you can access parts of your medical record, email your doctor's office, and request medication refills online. 1. In your internet browser, go to [email or web address] 2. Click on the First Time User? Click Here link in the Sign In box. You will see the New Member Sign Up page. 3. Enter your Industry Dive Access Code exactly as it appears below. You will not need to use this code after youve completed the sign-up process. If you do not sign up before the expiration date, you must request a new code. · Industry Dive Access Code: [email or web address] Expires: 6/3/2018  4:52 PM 
 
4. Enter the last four digits of your Social Security Number (xxxx) and Date of Birth (mm/dd/yyyy) as indicated and click Submit. You will be taken to the next sign-up page. 5. Create a Industry Dive ID. This will be your Industry Dive login ID and cannot be changed, so think of one that is secure and easy to remember. 6. Create a Industry Dive password. You can change your password at any time. 7. Enter your Password Reset Question and Answer. This can be used at a later time if you forget your password. 8. Enter your e-mail address. You will receive e-mail notification when new information is available in [address] 9. Click Sign Up. You can now view and download portions of your medical record. 10. Click the Download Summary menu link to download a portable copy of your medical information. If you have questions, please visit the Frequently Asked Questions section of the Blogic website. Remember, Blogic is NOT to be used for urgent needs. For medical emergencies, dial 911. Now available from your iPhone and Android! Please provide this summary of care documentation to your next provider. Your primary care clinician is listed as Emilie. If you have any questions after today's visit, please call [phone].

## 2018-04-06 NOTE — PROGRESS NOTES
1. Have you been to the ER, urgent care clinic since your last visit? Hospitalized since your last visit? No    2. Have you seen or consulted any other health care providers outside of the [facility] since your last visit? Include any pap smears or colon screening. No       Chief Complaint   Patient presents with    Annual Wellness Visit       Pap and Mammogram due in May, 2018.     Fasting

## 2018-04-06 NOTE — PATIENT INSTRUCTIONS
Asthma Attack: Care Instructions  Your Care Instructions    During an asthma attack, the airways swell and narrow. This makes it hard to breathe. Severe asthma attacks can be life-threatening, but you can help prevent them by keeping your asthma under control and treating symptoms before they get bad. Symptoms include being short of breath, having chest tightness, coughing, and wheezing. Noting and treating these symptoms can also help you avoid future trips to the emergency room. The doctor has checked you carefully, but problems can develop later. If you notice any problems or new symptoms, get medical treatment right away. Follow-up care is a key part of your treatment and safety. Be sure to make and go to all appointments, and call your doctor if you are having problems. It's also a good idea to know your test results and keep a list of the medicines you take. How can you care for yourself at home? · Follow your asthma action plan to prevent and treat attacks. If you don't have an asthma action plan, work with your doctor to create one. · Take your asthma medicines exactly as prescribed. Talk to your doctor right away if you have any questions about how to take them. ¨ Use your quick-relief medicine when you have symptoms of an attack. Quick-relief medicine is usually an albuterol inhaler. Some people need to use quick-relief medicine before they exercise. ¨ Take your controller medicine every day, not just when you have symptoms. Controller medicine is usually an inhaled corticosteroid. The goal is to prevent problems before they occur. Don't use your controller medicine to treat an attack that has already started. It doesn't work fast enough to help. ¨ If your doctor prescribed corticosteroid pills to use during an attack, take them exactly as prescribed. It may take hours for the pills to work, but they may make the episode shorter and help you breathe better.   ¨ Keep your quick-relief medicine with you at all times. · Talk to your doctor before using other medicines. Some medicines, such as aspirin, can cause asthma attacks in some people. · If you have a peak flow meter, use it to check how well you are breathing. This can help you predict when an asthma attack is going to occur. Then you can take medicine to prevent the asthma attack or make it less severe. · Do not smoke or allow others to smoke around you. Avoid smoky places. Smoking makes asthma worse. If you need help quitting, talk to your doctor about stop-smoking programs and medicines. These can increase your chances of quitting for good. · Learn what triggers an asthma attack for you, and avoid the triggers when you can. Common triggers include colds, smoke, air pollution, dust, pollen, mold, pets, cockroaches, stress, and cold air. · Avoid colds and the flu. Get a pneumococcal vaccine shot. If you have had one before, ask your doctor if you need a second dose. Get a flu vaccine every fall. If you must be around people with colds or the flu, wash your hands often. When should you call for help? Call 911 anytime you think you may need emergency care. For example, call if:  ? · You have severe trouble breathing. ?Call your doctor now or seek immediate medical care if:  ? · Your symptoms do not get better after you have followed your asthma action plan. ? · You have new or worse trouble breathing. ? · Your coughing and wheezing get worse. ? · You cough up dark brown or bloody mucus (sputum). ? · You have a new or higher fever. ? Watch closely for changes in your health, and be sure to contact your doctor if:  ? · You need to use quick-relief medicine on more than 2 days a week (unless it is just for exercise). ? · You cough more deeply or more often, especially if you notice more mucus or a change in the color of your mucus. ? · You are not getting better as expected. Where can you learn more?   Go to http://ravindra-bobby.info/. Enter D762 in the search box to learn more about \"Asthma Attack: Care Instructions. \"  Current as of: May 12, 2017  Content Version: 11.4  © 2655-2436 Healthwise, [employer]. Care instructions adapted under license by [employer] (which disclaims liability or warranty for this information). If you have questions about a medical condition or this instruction, always ask your healthcare professional. [place] [address] any warranty or liability for your use of this information.

## 2018-04-06 NOTE — PROGRESS NOTES
Annual Wellness Visit       HPI:     Jillian Chavez is a 49y.o. year old female who is here for her annual comprehensive healthcare exam and follow-up of medical problems include asthma, morbid obesity, GERD, and other medical problems. She is taking her medications and trying to follow her diet but knows she needs to better job with diet and exercise. She does seem to be fatigue during the daytime and apparently snores a lot. She denies any chest pain, shortness breath, palpitations or cardiorespiratory complaints. There are no GI or  complaints. She has no headaches, dizziness or neurological planes. She has no current arthritic complaints. There are no other complaints on complete review of systems. Visit Vitals    /82 (BP 1 Location: Left arm, BP Patient Position: Sitting)    Pulse 62    Temp 98.5 °F (36.9 °C) (Oral)    Resp 16    Ht 5' 8.5\" (1.74 m)    Wt 267 lb (121.1 kg)    LMP 04/04/2018    SpO2 98%    BMI 40.01 kg/m2       Past Medical History:   Diagnosis Date    Asthma     Edema of extremities 7/27/2010    Incontinence 7/27/2010    Indigestion 7/27/2010    Joint pain 7/27/2010    Morbid obesity ([address] 7/27/2010    Reflux 7/27/2010       Past Surgical History:   Procedure Laterality Date    ABDOMEN SURGERY PROC UNLISTED      Lap band '08, hernia    HX ORTHOPAEDIC      trigger finger    LAP, PLACE ADJUST GASTR BAND  12/4/08       Prior to Admission medications    Medication Sig Start Date End Date Taking? Authorizing Provider   albuterol (PROVENTIL HFA, VENTOLIN HFA, PROAIR HFA) 90 mcg/actuation inhaler Take 2 Puffs by inhalation every six (6) hours as needed for Wheezing. 12/1/17  Yes Tomas Alatorre MD   clobetasol (TEMOVATE) 0.05 % topical cream APPLY TOPICALLY BID PRN 6/13/17  Yes Historical Provider   cetirizine (ZYRTEC) 10 mg tablet Take  by mouth as needed. 5/29/10  Yes Codey Nina MD   MULTIVITAMINS (MULTI-VITAMIN PO) Take  by mouth daily.  5/29/10  Yes Codey Nina MD        No Known Allergies     Family History   Problem Relation Age of Onset    Hypertension Mother    Dawna Spears Other Mother 76     aneurysm in her head    High Cholesterol Father     Heart Disease Father        Social History     Social History    Marital status:      Spouse name: N/A    Number of children: N/A    Years of education: N/A     Occupational History    Not on file. Social History Main Topics    Smoking status: Never Smoker    Smokeless tobacco: Never Used    Alcohol use Yes      Comment: socially    Drug use: No    Sexual activity: Yes     Partners: Male     Birth control/ protection: None     Other Topics Concern    Not on file     Social History Narrative        ROS:     Constitutional: She denies fevers, weight loss, sweats. .  Daytime fatigue and does snore a lot according to her   Eyes: No blurred or double vision. ENT: No difficulty with swallowing, taste, speech or smell. NECK: no stiffness swelling or lymph node enlargement  Respiratory: No cough wheezing or shortness of breath. Cardiovascular: Denies chest pain, palpitations, unexplained indigestion or syncope. Breast: She has noted no masses or lumps and no discharge or axillary swelling  Gastrointestinal:  No changes in bowel movements, no abdominal pain, no bloating. Genitourinary: No discharge or abnormal bleeding or pain  Extremities: No joint pain, stiffness or swelling. Neurological:  No numbness, tingling, burring paresthesias or loss of motor strength. No syncope, dizziness or frequent headache  Skin:  No recent rashes or mole changes. Psychiatric/Behavioral:  Negative for depression. The patient is not nervous/anxious.    HEMATOLOGIC: no easy bruising or bleeding gums  Endocrine: no sweats of urinary frequency or excessive thirst      Physical Examination:     Vitals:    04/06/18 0807   BP: 120/82   Pulse: 62   Resp: 16   Temp: 98.5 °F (36.9 °C)   TempSrc: Oral   SpO2: 98%   Weight: 267 lb (121.1 kg)   Height: 5' 8.5\" (1.74 m)   PainSc:   0 - No pain   LMP: 04/04/2018        General appearance -morbidly obese white female, and in no distress  Mental status - alert, oriented to person, place, and time  HEENT:  Ears - bilateral TM's and external ear canals clear  Eyes - pupillary responses were normal.  Extraocular muscle function intact. Lids and conjunctiva not injected. Fundoscopic exam revealed sharp disc margins. eye movements intact  Pharynx- clear with teeth in good repair. No masses were noted  Neck - supple without thyromegaly or burit. No JVD noted  Lungs - clear to auscultation and percussion  Cardiac- normal rate, regular rhythm without murmurs. PMI not displaced. No gallop, rub or click  Breast: deferred to GYN  Abdomen - flat, soft, non-tender without palpable organomegaly or mass. No pulsatile mass was felt, and not bruit was heard. Bowel sounds were active   Female - deferred to GYN  Rectal - deferred to GYN  Extremities -  no clubbing cyanosis or edema  Lymphatics - no palpable lymphadenopathy, no hepatosplenomegaly  Peripheral vascular - Dorsalis pedis and posterior tibial pulses felt without difficulty  Skin - no rash or unusual mole change noted  Neurological - Cranial nerves II-XII grossly intact. Motor strength 5/5. DTR's 2+ and symmetric. Station and gait normal  Back exam - full range of motion, no tenderness, palpable spasm or pain on motion  Musculoskeletal - no joint tenderness, deformity or swelling  Hematologic: no purpura, petechiae or bruising    Assessment/Plan:     1. Annual physical exam    2. Gastroesophageal reflux disease without esophagitis    3. Morbid obesity ([address]    4. Primary osteoarthritis involving multiple joints    5. Mild intermittent asthma without complication    6. Sleep disturbance      Impression  1. Annual physical examination normal except for morbid obesity which we discussed diet exercise weight reduction  2.   GERD that is currently stable  3. Morbid obesity as noted discussed diet exercise weight reduction for [facility]  4. DJD certainly point that her that is going to get worse particular in the lower extremities with the weight does not come down  5. Asthma that is currently stable  6. Sleep disturbance I have set her up for a sleep study will make further recommendations based upon that  Follow-up scheduled again for 6 months or sooner should there be a problem. We will call the lab results and make adjustments if necessary.     Orders Placed This Encounter    REFERRAL TO SLEEP STUDIES     Referral Priority:   Routine     Referral Type:   Consultation     Referral Reason:   Specialty Services Required    AMB POC LIPID PROFILE    AMB POC T4, FREE    AMB POC COMPREHENSIVE METABOLIC PANEL    AMB POC COMPLETE CBC,AUTOMATED ENTER    AMB POC URINALYSIS DIP STICK AUTO W/ MICRO     AMB POC TSH        Results for orders placed or performed in visit on 04/06/18   AMB POC LIPID PROFILE   Result Value Ref Range    Cholesterol (POC)  0 - 200 mg/dL    Triglycerides (POC)  0 - 200 mg/dL    HDL Cholesterol (POC)  35 - 130 mg/dL    VLDL (POC)  MG/DL    LDL Cholesterol (POC)  0.0 - 130.0 MG/DL    TChol/HDL Ratio (POC)  0.0 - 4.0   AMB POC T4, FREE   Result Value Ref Range    FREE T4 (POC)  0.71 - 1.85 ng/dL   AMB POC COMPREHENSIVE METABOLIC PANEL   Result Value Ref Range    GLUCOSE  65 - 105 mg/dL    BUN  7 - 17 mg/dL    Creatinine (POC)  0.7 - 1.2 mg/dL    Sodium (POC)  137 - 145 MMOL/L    Potassium (POC)  3.6 - 5.0 MMOL/L    CHLORIDE  98 - 107 MMOL/L    CO2  22 - 32 MMOL/L    CALCIUM  8.4 - 10.2 mg/dL    TOTAL PROTEIN  6.3 - 8.2 g/dL    ALBUMIN  3.9 - 5.4 g/dL    AST (POC)  14 - 36 U/L    ALT (POC)  9 - 52 U/L    ALKALINE PHOS  38 - 126 U/L    TOTAL BILIRUBIN  0.2 - 1.3 mg/dL    eGFR (POC)     AMB POC COMPLETE CBC,AUTOMATED ENTER   Result Value Ref Range    WBC (POC)  4.1 - 10.9 K/uL    RBC (POC)  4.20 - 6.30 M/uL    HGB (POC)  12.0 - 18.0 g/dL    HCT (POC)  37.0 - 51.0 %    MCV (POC)  80.0 - 97.0 fL    MCH (POC)  26.0 - 32.0 pg    MCHC (POC)  30.0 - 36.0 g/dL    PLATELET (POC)  649.9 - 440.0 K/uL    ABS. LYMPHS (POC)  0.6 - 4.1 K/uL    LYMPHOCYTES (POC)  10.0 - 58.5 %    Mid # (POC)  0.0 - 1.8 K/uL    MID% POC  0.1 - 24.0 %    ABS. GRANS (POC)  2.0 - 7.8 K/uL    GRANULOCYTES (POC)  37.0 - 92.0 %   AMB POC URINALYSIS DIP STICK AUTO W/ MICRO    Result Value Ref Range    Color (UA POC)      Clarity (UA POC)      Glucose (UA POC)  Negative    Bilirubin (UA POC)  Negative    Ketones (UA POC)  Negative    Specific gravity (UA POC)  1.010 - 1.025    Blood (UA POC)  Negative    pH (UA POC)  5.0 - 7.0    Protein (UA POC)  Negative    Urobilinogen (UA POC)  0.2 - 1    Nitrites (UA POC)  Negative    Leukocyte esterase (UA POC)  Negative    Epithelial cells (UA POC)      Mucus (UA POC)      WBCs (UA POC)      RBCs (UA POC)      Casts (UA POC)  Negative    Crystals (UA POC)  Negative    Clue Cells (UA POC)      Trichomonas (UA POC)      Yeast (UA POC)      Bacteria (UA POC)  Negative    URINE CULT COMMENT (UA POC)     AMB POC TSH   Result Value Ref Range    TSH POC  0.40 - 4.20 uIU/ml       I have reviewed the patient's medical history in detail and updated the computerized patient record. We had a prolonged discussion about these complex clinical issues and went over the various important aspects to consider. All questions were answered. Advised her to call back or return to office if symptoms do not improve, change in nature, or persist.    She was given an after visit summary or informed of [facility] which includes patient instructions, diagnoses, current medications, & vitals. She expressed understanding with the diagnosis and plan.       Fredrick Patterson MD

## 2018-04-09 LAB
BACTERIA UA POCT, BACTPOCT: NORMAL
BILIRUB UR QL STRIP: NEGATIVE
CASTS UA POCT: NORMAL
CHOLEST SERPL-MCNC: 182 MG/DL (ref 0–200)
CLUE CELLS, CLUEPOCT: NEGATIVE
CRYSTALS UA POCT, CRYSPOCT: NEGATIVE
EPITHELIAL CELLS POCT: NEGATIVE
GLUCOSE UR-MCNC: NEGATIVE MG/DL
GRAN# POC: 5.5 K/UL (ref 2–7.8)
GRAN% POC: 68.2 % (ref 37–92)
HCT VFR BLD CALC: 40.1 % (ref 37–51)
HDLC SERPL-MCNC: 61 MG/DL (ref 35–130)
HGB BLD-MCNC: 13.3 G/DL (ref 12–18)
KETONES P FAST UR STRIP-MCNC: NEGATIVE MG/DL
LDL CHOLESTEROL POC: 109.2 MG/DL (ref 0–130)
LY# POC: 2.1 K/UL (ref 0.6–4.1)
LY% POC: 26.9 % (ref 10–58.5)
MCH RBC QN: 30.5 PG (ref 26–32)
MCHC RBC-ENTMCNC: 33.2 G/DL (ref 30–36)
MCV RBC: 92 FL (ref 80–97)
MID #, POC: 0.3 K/UL (ref 0–1.8)
MID% POC: 4.9 % (ref 0.1–24)
MUCUS UA POCT, MUCPOCT: NORMAL
PH UR STRIP: 6 [PH] (ref 5–7)
PLATELET # BLD: 373 K/UL (ref 140–440)
PROT UR QL STRIP: NEGATIVE
RBC # BLD: 4.37 M/UL (ref 4.2–6.3)
RBC UA POCT, RBCPOCT: 0
SP GR UR STRIP: 1.01 (ref 1.01–1.02)
T4 FREE SERPL-MCNC: 1.1 NG/DL (ref 0.71–1.85)
TCHOL/HDL RATIO (POC): 3 (ref 0–4)
TRICH UA POCT, TRICHPOC: NEGATIVE
TRIGL SERPL-MCNC: 59 MG/DL (ref 0–200)
TSH BLD-ACNC: 1.51 UIU/ML (ref 0.4–4.2)
UA UROBILINOGEN AMB POC: NORMAL (ref 0.2–1)
URINALYSIS CLARITY POC: CLEAR
URINALYSIS COLOR POC: NORMAL
URINE BLOOD POC: NORMAL
URINE CULT COMMENT, POCT: NORMAL
URINE LEUKOCYTES POC: NEGATIVE
URINE NITRITES POC: NEGATIVE
VLDLC SERPL CALC-MCNC: 11.8 MG/DL
WBC # BLD: 7.9 K/UL (ref 4.1–10.9)
WBC UA POCT, WBCPOCT: 0
YEAST UA POCT, YEASTPOC: NEGATIVE

## 2018-04-10 DIAGNOSIS — G47.9 SLEEP DISTURBANCE: Primary | ICD-10-CM

## 2018-04-20 RX ORDER — CLOBETASOL PROPIONATE 0.5 MG/G
CREAM TOPICAL
Qty: 45 G | Refills: 5 | Status: SHIPPED | OUTPATIENT
Start: 2018-04-20 | End: 2018-06-25 | Stop reason: SDUPTHER

## 2018-04-20 NOTE — TELEPHONE ENCOUNTER
RX refill request from the patient/pharmacy. Patient last seen 04- with labs, and next appt. scheduled for 05-.

## 2018-05-04 DIAGNOSIS — G47.33 OBSTRUCTIVE SLEEP APNEA SYNDROME: Primary | ICD-10-CM

## 2018-06-25 RX ORDER — CLOBETASOL PROPIONATE 0.5 MG/G
CREAM TOPICAL
Qty: 60 G | Refills: 5 | Status: SHIPPED | OUTPATIENT
Start: 2018-06-25 | End: 2019-06-30 | Stop reason: SDUPTHER

## 2018-06-25 NOTE — TELEPHONE ENCOUNTER
Requested Prescriptions     Pending Prescriptions Disp Refills    clobetasol (TEMOVATE) 0.05 % topical cream 60 g 5     Sig: APPLY TOPICALLY BID PRN

## 2018-07-12 ENCOUNTER — OFFICE VISIT (OUTPATIENT)
Dept: INTERNAL MEDICINE CLINIC | Age: 48
End: 2018-07-12

## 2018-07-12 ENCOUNTER — HOSPITAL ENCOUNTER (OUTPATIENT)
Dept: ULTRASOUND IMAGING | Age: 48
Discharge: HOME | End: 2018-07-12
Attending: NURSE PRACTITIONER
Payer: COMMERCIAL

## 2018-07-12 VITALS
WEIGHT: 262 LBS | OXYGEN SATURATION: 98 % | DIASTOLIC BLOOD PRESSURE: 75 MMHG | SYSTOLIC BLOOD PRESSURE: 119 MMHG | TEMPERATURE: 98.4 F | HEIGHT: 69 IN | BODY MASS INDEX: 38.8 KG/M2 | HEART RATE: 73 BPM

## 2018-07-12 DIAGNOSIS — R10.2 PELVIC PAIN: ICD-10-CM

## 2018-07-12 DIAGNOSIS — N39.0 URINARY TRACT INFECTION WITHOUT HEMATURIA, SITE UNSPECIFIED: Primary | ICD-10-CM

## 2018-07-12 LAB
BACTERIA UA POCT, BACTPOCT: NORMAL
BILIRUB UR QL STRIP: NEGATIVE
CASTS UA POCT: NORMAL
CLUE CELLS, CLUEPOCT: NEGATIVE
CRYSTALS UA POCT, CRYSPOCT: NEGATIVE
EPITHELIAL CELLS POCT: NORMAL
GLUCOSE UR-MCNC: NEGATIVE MG/DL
KETONES P FAST UR STRIP-MCNC: NEGATIVE MG/DL
MUCUS UA POCT, MUCPOCT: NORMAL
PH UR STRIP: 6 [PH] (ref 5–7)
PROT UR QL STRIP: NEGATIVE
RBC UA POCT, RBCPOCT: NORMAL
SP GR UR STRIP: 1.01 (ref 1.01–1.02)
TRICH UA POCT, TRICHPOC: NEGATIVE
UA UROBILINOGEN AMB POC: NORMAL (ref 0.2–1)
URINALYSIS CLARITY POC: CLEAR
URINALYSIS COLOR POC: YELLOW
URINE BLOOD POC: NORMAL
URINE CULT COMMENT, POCT: NORMAL
URINE LEUKOCYTES POC: NEGATIVE
URINE NITRITES POC: NEGATIVE
WBC UA POCT, WBCPOCT: 0
YEAST UA POCT, YEASTPOC: NEGATIVE

## 2018-07-12 PROCEDURE — 76830 TRANSVAGINAL US NON-OB: CPT

## 2018-07-12 PROCEDURE — 76856 US EXAM PELVIC COMPLETE: CPT

## 2018-07-12 NOTE — MR AVS SNAPSHOT
[facility] 
 
 
 [address] [id number] [phone] Patient: Liat Parra MRN: [id number] :1970 Visit Information Date & Time Provider Department Dept. Phone Encounter #  
 2018  1:15 PM Alhaji Cox NP [address] [place] [address] [phone] [id number] Follow-up Instructions Return if symptoms worsen or fail to improve. Your Appointments 5/3/2019  8:00 AM  
FOLLOW UP 10 with MD CHICO Woodward [facility] ([facility]) Appt Note: follow up yearly; follow up yearly [address] [phone] [address] [facility] [id number] Upcoming Health Maintenance Date Due Pneumococcal 19-64 Medium Risk (1 of 1 - PPSV23) 1988 DTaP/Tdap/Td series (1 - Tdap) 1990 PAP AKA CERVICAL CYTOLOGY 1990 Influenza Age 5 to Adult 2018 BREAST CANCER SCRN MAMMOGRAM 2019 Allergies as of 2018  Review Complete On: 2018 By: Alhaji Cox NP No Known Allergies Current Immunizations  Never Reviewed No immunizations on file. Not reviewed this visit You Were Diagnosed With   
  
 Codes Comments Urinary tract infection without hematuria, site unspecified    -  Primary ICD-10-CM: N39.0 ICD-9-CM: 599.0 Pelvic pain     ICD-10-CM: R10.2 ICD-9-CM: [id number] Vitals BP Pulse Temp Height(growth percentile) Weight(growth percentile) SpO2  
 119/75 (BP 1 Location: Left arm, BP Patient Position: Sitting) 73 98.4 °F (36.9 °C) (Oral) 5' 8.5\" (1.74 m) 262 lb (118.8 kg) 98% BMI OB Status Smoking Status 39.26 kg/m2 Premenopausal Never Smoker Vitals History BMI and BSA Data Body Mass Index Body Surface Area  
 39.26 kg/m 2 2.4 m 2 Preferred Pharmacy Pharmacy Name Phone [phone] [address], [address] AT [place] [address] [phone] Your Updated Medication List  
  
   
This list is accurate as of 7/12/18  5:32 PM.  Always use your most recent med list.  
  
  
  
  
 albuterol 90 mcg/actuation inhaler Commonly known as:  PROVENTIL HFA, VENTOLIN HFA, PROAIR HFA Take 2 Puffs by inhalation every six (6) hours as needed for Wheezing. calcium carbonate 400 mg Chew Commonly known as:  MYLANTA Take 1 Tab by mouth three (3) times daily (with meals). clobetasol 0.05 % topical cream  
Commonly known as:  TEMOVATE  
APPLY TOPICALLY BID PRN  
  
 MULTI-VITAMIN PO Take  by mouth daily. ZyrTEC 10 mg tablet Generic drug:  cetirizine Take  by mouth as needed. We Performed the Following AMB POC URINALYSIS DIP STICK AUTO W/ MICRO  [09394 CPT(R)] Follow-up Instructions Return if symptoms worsen or fail to improve. To-Do List   
 07/12/2018 Imaging:  US PELV NON OBS Patient Instructions Pelvic Pain: Care Instructions Your Care Instructions Pelvic pain, or pain in the lower belly, can have many causes. Often pelvic pain is not serious and gets better in a few days. If your pain continues or gets worse, you may need tests and treatment. Tell your doctor about any new symptoms. These may be signs of a serious problem. Follow-up care is a key part of your treatment and safety. Be sure to make and go to all appointments, and call your doctor if you are having problems. It's also a good idea to know your test results and keep a list of the medicines you take. How can you care for yourself at home? · Rest until you feel better. Lie down, and raise your legs by placing a pillow under your knees. · Drink plenty of fluids. You may find that small, frequent sips are easier on your stomach than if you drink a lot at once.  Avoid drinks with carbonation or caffeine, such as soda pop, tea, or coffee. · Try eating several small meals instead of 2 or 3 large ones. Eat mild foods, such as rice, dry toast or crackers, bananas, and applesauce. Avoid fatty and spicy foods, other fruits, and alcohol until 48 hours after your symptoms have gone away. · Take an over-the-counter pain medicine, such as acetaminophen (Tylenol), ibuprofen (Advil, Motrin), or naproxen (Aleve). Read and follow all instructions on the label. · Do not take two or more pain medicines at the same time unless the doctor told you to. Many pain medicines have acetaminophen, which is Tylenol. Too much acetaminophen (Tylenol) can be harmful. · You can put a heating pad, a warm cloth, or moist heat on your belly to relieve pain. When should you call for help? Call your doctor now or seek immediate medical care if: 
  · You have a new or higher fever.  
  · You have unusual vaginal bleeding.  
  · You have new or worse belly or pelvic pain.  
  · You have vaginal discharge that has increased in amount or smells bad.  
 Watch closely for changes in your health, and be sure to contact your doctor if: 
  · You do not get better as expected. Where can you learn more? Go to http://ravindra-bobby.info/. Enter [phone] in the search box to learn more about \"Pelvic Pain: Care Instructions. \" Current as of: October 6, 2017 Content Version: 11.7 © 7679-0754 [employer]. Care instructions adapted under license by [employer] (which disclaims liability or warranty for this information). If you have questions about a medical condition or this instruction, always ask your healthcare professional. [place] [address] any warranty or liability for your use of this information. Introducing [facility]!    
 [employer] introduces [employer] patient portal. Now you can access parts of your medical record, email your doctor's office, and request medication refills online. 1. In your internet browser, go to [email or web address] 2. Click on the First Time User? Click Here link in the Sign In box. You will see the New Member Sign Up page. 3. Enter your OVGuide Access Code exactly as it appears below. You will not need to use this code after youve completed the sign-up process. If you do not sign up before the expiration date, you must request a new code. · OVGuide Access Code: [email or web address] Expires: 10/10/2018  1:27 PM 
 
4. Enter the last four digits of your Social Security Number (xxxx) and Date of Birth (mm/dd/yyyy) as indicated and click Submit. You will be taken to the next sign-up page. 5. Create a OVGuide ID. This will be your OVGuide login ID and cannot be changed, so think of one that is secure and easy to remember. 6. Create a OVGuide password. You can change your password at any time. 7. Enter your Password Reset Question and Answer. This can be used at a later time if you forget your password. 8. Enter your e-mail address. You will receive e-mail notification when new information is available in [address] 9. Click Sign Up. You can now view and download portions of your medical record. 10. Click the Download Summary menu link to download a portable copy of your medical information. If you have questions, please visit the Frequently Asked Questions section of the OVGuide website. Remember, OVGuide is NOT to be used for urgent needs. For medical emergencies, dial 911. Now available from your iPhone and Android! Please provide this summary of care documentation to your next provider. Your primary care clinician is listed as Emilie. If you have any questions after today's visit, please call [phone].

## 2018-07-12 NOTE — PROGRESS NOTES
Gunjan Valentine presents with   Chief Complaint   Patient presents with    Urinary Burning    Abdominal Pain    LOW BACK PAIN    Fatigue   Patient of Dr Rufina Gerardo here with complaint of urinary burning, frequency & pressure, low abdomina pain, low back pain & fatigue for over 1 week. No fever or chills noted. 1. Have you been to the ER, urgent care clinic since your last visit? Hospitalized since your last visit? No    2. Have you seen or consulted any other health care providers outside of the [address] [place] [address] since your last visit? Include any pap smears or colon screening.  No

## 2018-07-12 NOTE — PATIENT INSTRUCTIONS
Pelvic Pain: Care Instructions  Your Care Instructions    Pelvic pain, or pain in the lower belly, can have many causes. Often pelvic pain is not serious and gets better in a few days. If your pain continues or gets worse, you may need tests and treatment. Tell your doctor about any new symptoms. These may be signs of a serious problem. Follow-up care is a key part of your treatment and safety. Be sure to make and go to all appointments, and call your doctor if you are having problems. It's also a good idea to know your test results and keep a list of the medicines you take. How can you care for yourself at home? · Rest until you feel better. Lie down, and raise your legs by placing a pillow under your knees. · Drink plenty of fluids. You may find that small, frequent sips are easier on your stomach than if you drink a lot at once. Avoid drinks with carbonation or caffeine, such as soda pop, tea, or coffee. · Try eating several small meals instead of 2 or 3 large ones. Eat mild foods, such as rice, dry toast or crackers, bananas, and applesauce. Avoid fatty and spicy foods, other fruits, and alcohol until 48 hours after your symptoms have gone away. · Take an over-the-counter pain medicine, such as acetaminophen (Tylenol), ibuprofen (Advil, Motrin), or naproxen (Aleve). Read and follow all instructions on the label. · Do not take two or more pain medicines at the same time unless the doctor told you to. Many pain medicines have acetaminophen, which is Tylenol. Too much acetaminophen (Tylenol) can be harmful. · You can put a heating pad, a warm cloth, or moist heat on your belly to relieve pain. When should you call for help?   Call your doctor now or seek immediate medical care if:    · You have a new or higher fever.     · You have unusual vaginal bleeding.     · You have new or worse belly or pelvic pain.     · You have vaginal discharge that has increased in amount or smells bad.    Watch closely for changes in your health, and be sure to contact your doctor if:    · You do not get better as expected. Where can you learn more? Go to http://ravindra-bobby.info/. Enter [phone] in the search box to learn more about \"Pelvic Pain: Care Instructions. \"  Current as of: October 6, 2017  Content Version: 11.7  © 6883-3359 [employer]. Care instructions adapted under license by [employer] (which disclaims liability or warranty for this information). If you have questions about a medical condition or this instruction, always ask your healthcare professional. [place] [address] any warranty or liability for your use of this information.

## 2018-07-12 NOTE — PROGRESS NOTES
Dictation on: 07/12/2018  5:30 PM by: German Carr             Past Medical History:   Diagnosis Date    Asthma     Edema of extremities 7/27/2010    Incontinence 7/27/2010    Indigestion 7/27/2010    Joint pain 7/27/2010    Morbid obesity ([facility] 7/27/2010    Reflux 7/27/2010     Past Surgical History:   Procedure Laterality Date    ABDOMEN SURGERY PROC UNLISTED      Lap band '08, hernia    HX ORTHOPAEDIC      trigger finger    LAP, PLACE ADJUST GASTR BAND  12/4/08       Current Outpatient Prescriptions on File Prior to Visit   Medication Sig Dispense Refill    clobetasol (TEMOVATE) 0.05 % topical cream APPLY TOPICALLY BID PRN 60 g 5    cetirizine (ZYRTEC) 10 mg tablet Take  by mouth as needed.  MULTIVITAMINS (MULTI-VITAMIN PO) Take  by mouth daily.  albuterol (PROVENTIL HFA, VENTOLIN HFA, PROAIR HFA) 90 mcg/actuation inhaler Take 2 Puffs by inhalation every six (6) hours as needed for Wheezing. 1 Inhaler 1     No current facility-administered medications on file prior to visit.       No Known Allergies

## 2018-07-13 ENCOUNTER — OFFICE VISIT (OUTPATIENT)
Dept: INTERNAL MEDICINE CLINIC | Age: 48
End: 2018-07-13

## 2018-07-13 VITALS
BODY MASS INDEX: 39.23 KG/M2 | DIASTOLIC BLOOD PRESSURE: 80 MMHG | RESPIRATION RATE: 16 BRPM | OXYGEN SATURATION: 98 % | SYSTOLIC BLOOD PRESSURE: 124 MMHG | HEART RATE: 74 BPM | WEIGHT: 261.8 LBS

## 2018-07-13 DIAGNOSIS — K52.9 COLITIS: ICD-10-CM

## 2018-07-13 DIAGNOSIS — R10.32 LEFT LOWER QUADRANT PAIN: Primary | ICD-10-CM

## 2018-07-13 LAB
GRAN# POC: 10 K/UL (ref 2–7.8)
GRAN% POC: 79.8 % (ref 37–92)
HCT VFR BLD CALC: 39.3 % (ref 37–51)
HGB BLD-MCNC: 13.1 G/DL (ref 12–18)
LY# POC: 2.1 K/UL (ref 0.6–4.1)
LY% POC: 16.9 % (ref 10–58.5)
MCH RBC QN: 30.2 PG (ref 26–32)
MCHC RBC-ENTMCNC: 33.4 G/DL (ref 30–36)
MCV RBC: 90 FL (ref 80–97)
MID #, POC: 0.4 K/UL (ref 0–1.8)
MID% POC: 3.3 % (ref 0.1–24)
PLATELET # BLD: 397 K/UL (ref 140–440)
RBC # BLD: 4.36 M/UL (ref 4.2–6.3)
WBC # BLD: 12.5 K/UL (ref 4.1–10.9)

## 2018-07-13 RX ORDER — CIPROFLOXACIN 500 MG/1
500 TABLET ORAL 2 TIMES DAILY
Qty: 20 TAB | Refills: 0 | Status: SHIPPED | OUTPATIENT
Start: 2018-07-13 | End: 2018-07-26 | Stop reason: ALTCHOICE

## 2018-07-13 NOTE — PROGRESS NOTES
1. Have you been to the ER, urgent care clinic since your last visit? Hospitalized since your last visit? No    2. Have you seen or consulted any other health care providers outside of the [address] [place] [address] since your last visit? Include any pap smears or colon screening.  No     Chief Complaint   Patient presents with    Abdominal Pain     F/U for results of ultrasound 07/12/2018

## 2018-07-13 NOTE — PROGRESS NOTES
White blood count is mildly elevated consistent with infection for which I placed on Cipro so we will recheck that on follow-up.

## 2018-07-13 NOTE — PATIENT INSTRUCTIONS
Colitis: Care Instructions Your Care Instructions Colitis is the medical term for swelling (inflammation) of the intestine. It can be caused by different things, such as an infection or loss of blood flow in the intestine. Other causes are problems like Crohn's disease or ulcerative colitis. Symptoms may include fever, diarrhea that may be bloody, or belly pain. Sometimes symptoms go away without treatment. But you may need treatment or more tests, such as blood tests or a stool test. Or you may need imaging tests like a CT scan or a colonoscopy. In some cases, the doctor may want to test a sample of tissue from the intestine. This test is called a biopsy. The doctor has checked you carefully, but problems can develop later. If you notice any problems or new symptoms, get medical treatment right away. Follow-up care is a key part of your treatment and safety. Be sure to make and go to all appointments, and call your doctor if you are having problems. It's also a good idea to know your test results and keep a list of the medicines you take. How can you care for yourself at home? · Rest until you feel better. · Your doctor may recommend that you eat bland foods. These include rice, dry toast or crackers, bananas, and applesauce. · To prevent dehydration, drink plenty of fluids. Choose water and other caffeine-free clear liquids until you feel better. If you have kidney, heart, or liver disease and have to limit fluids, talk with your doctor before you increase the amount of fluids you drink. · Be safe with medicines. Take your medicines exactly as prescribed. Call your doctor if you think you are having a problem with your medicine. You will get more details on the specific medicines your doctor prescribes. When should you call for help? Call 911 anytime you think you may need emergency care. For example, call if: 
  · You passed out (lost consciousness).  
  · Your stools are maroon or very bloody.  Call your doctor now or seek immediate medical care if: 
  · You have new or worse belly pain.  
  · You have a fever.  
  · You are vomiting.  
  · You cannot pass stools or gas.  
  · You have new or more blood in your stools.  
 Watch closely for changes in your health, and be sure to contact your doctor if: 
  · You have new or worse symptoms.  
  · You are losing weight.  
  · You do not get better as expected. Where can you learn more? Go to http://ravindra-bobby.info/. Brandon Millan in the search box to learn more about \"Colitis: Care Instructions. \" Current as of: May 12, 2017 Content Version: 11.7 © 1784-2363 [employer]. Care instructions adapted under license by [employer] (which disclaims liability or warranty for this information). If you have questions about a medical condition or this instruction, always ask your healthcare professional. [address] any warranty or liability for your use of this information.

## 2018-07-13 NOTE — PROGRESS NOTES
Chief Complaint   Patient presents with    Abdominal Pain     F/U for results of ultrasound 07/12/2018       SUBJECTIVE:    Karen Vasquez is a 49 y.o. female who returns in follow-up for lower abdominal/suprapubic pain after being seen by the nurse practitioner yesterday for same. At that point she was sent for an ultrasound which has returned negative other than a mild amount of fluid in the pelvis and a nonvisualization the right ovary as well as a intramural fibroid uterus that only is 2.1 x 1.5 cm. She continues with the pain. The history that she gives that she started with diarrhea about the 21st to 25 June. She started with a menstrual period on 25 June and then lasted through 2 July. She initially thought the pain that she had was related to the diarrhea and then menstrual period but that has all resolved except for the pain is continued. She does have a sensation that she has urinary urge and frequency but denies any dysuria and has noted no back pain or fevers or chills. A urine was obtained yesterday and that returned negative. She notes that the bowel him to return to normal but she does have the urge to urinate more. She denies any fevers or chills. She notes no other particular complaints. Current Outpatient Prescriptions   Medication Sig Dispense Refill    ciprofloxacin HCl (CIPRO) 500 mg tablet Take 1 Tab by mouth two (2) times a day. 20 Tab 0    calcium carbonate (MYLANTA) 400 mg chew Take 1 Tab by mouth three (3) times daily (with meals).  clobetasol (TEMOVATE) 0.05 % topical cream APPLY TOPICALLY BID PRN 60 g 5    albuterol (PROVENTIL HFA, VENTOLIN HFA, PROAIR HFA) 90 mcg/actuation inhaler Take 2 Puffs by inhalation every six (6) hours as needed for Wheezing. 1 Inhaler 1    cetirizine (ZYRTEC) 10 mg tablet Take  by mouth as needed.  MULTIVITAMINS (MULTI-VITAMIN PO) Take  by mouth daily.        Past Medical History:   Diagnosis Date    Asthma     Edema of extremities 7/27/2010    Incontinence 7/27/2010    Indigestion 7/27/2010    Joint pain 7/27/2010    Morbid obesity ([address] 7/27/2010    Reflux 7/27/2010     Past Surgical History:   Procedure Laterality Date    ABDOMEN SURGERY PROC UNLISTED      Lap band '08, hernia    HX ORTHOPAEDIC      trigger finger    LAP, PLACE ADJUST GASTR BAND  12/4/08     No Known Allergies    REVIEW OF SYSTEMS:  General: negative for - chills or fever, or weight loss or gain  ENT: negative for - headaches, nasal congestion or tinnitus  Eyes: no blurred or visual changes  Neck: No stiffness or swollen nodes  Respiratory: negative for - cough, hemoptysis, shortness of breath or wheezing  Cardiovascular : negative for - chest pain, edema, palpitations or shortness of breath  Gastrointestinal: negative for -  blood in stools, heartburn or nausea/vomiting.   Positive abdominal pain as noted  Genito-Urinary: no dysuria, trouble voiding, or hematuria positive frequency  Musculoskeletal: negative for - gait disturbance, joint pain, joint stiffness or joint swelling  Neurological: no TIA or stroke symptoms  Hematologic: no bruises, no bleeding  Lymphatic: no swollen glands  Integument: no lumps, mole changes, nail changes or rash  Endocrine:no malaise/lethargy poly uria or polydipsia or unexpected weight changes        Social History     Social History    Marital status:      Spouse name: N/A    Number of children: N/A    Years of education: N/A     Social History Main Topics    Smoking status: Never Smoker    Smokeless tobacco: Never Used    Alcohol use Yes      Comment: socially    Drug use: No    Sexual activity: Yes     Partners: Male     Birth control/ protection: None     Other Topics Concern    None     Social History Narrative     Family History   Problem Relation Age of Onset    Hypertension Mother    [address] [facility] Other Mother 76     aneurysm in her head    High Cholesterol Father     Heart Disease Father        OBJECTIVE: Visit Vitals    /80 (BP 1 Location: Left arm, BP Patient Position: Sitting)    Pulse 74    Resp 16    Ht (P) 5' 8.5\" (1.74 m)    Wt 261 lb 12.8 oz (118.8 kg)    SpO2 98%    BMI (P) 39.23 kg/m2     CONSTITUTIONAL:   well nourished, appears age appropriate  EYES: sclera anicteric, PERRL, EOMI  ENMT:nares clear, moist mucous membranes, pharynx clear  NECK: supple. Thyroid normal, No JVD or bruits  RESPIRATORY: Chest: clear to ascultation and percussion, normal inspiratory effort  CARDIOVASCULAR: Heart: regular rate and rhythm no murmurs, rubs or gallops, PMI not displaced, No thrills  GASTROINTESTINAL: Abdomen: non distended, soft, bowel sounds normal.  Positive left suprapubic tenderness  HEMATOLOGIC: no purpura, petechiae or bruising  LYMPHATIC: No lymph node enlargemant  MUSCULOSKELETAL: Extremities: no edema or active synovitis, pulse 1+   INTEGUMENT: No unusual rashes or suspicious skin lesions noted. Nails appear normal.  PERIPHERAL VASCULAR: normal pulses femoral, PT and DP  NEUROLOGIC: non-focal exam, A & O X 3  PSYCHIATRIC:, appropriate affect     ASSESSMENT:   1. Left lower quadrant pain    2. Colitis      Impression  1. Left lower quadrant abdominal pain  2. Probable colitis at this point I will place on Cipro for 10 days to get a CT to further evaluate and recheck after the CT results. I will see her sooner should they be a problem. PLAN:  .  Orders Placed This Encounter    CT ABD W CONT AND PELVIS W WO CONT    AMB POC COMPLETE CBC,AUTOMATED ENTER    ciprofloxacin HCl (CIPRO) 500 mg tablet         ATTENTION:   This medical record was transcribed using an electronic medical records system. Although proofread, it may and can contain electronic and spelling errors. Other human spelling and other errors may be present. Corrections may be executed at a later time. Please feel free to contact us for any clarifications as needed.       Follow-up Disposition: Not on File    No results found for any visits on 07/13/18. Chaparro Madrid MD    The patient verbalized understanding of the problems and plans as explained.

## 2018-07-13 NOTE — MR AVS SNAPSHOT
[facility] 
 
 
 [address] [address] [phone] Patient: Reyna Galeas MRN: [id number] :1970 Visit Information Date & Time Provider Department Dept. Phone Encounter #  
 2018  4:00 PM Maren Argueta Rajesh Soto  [phone] [id number] Your Appointments 2018  4:00 PM  
FOLLOW UP 10 with MD ALVINO Eaton [facility] ([facility]) Appt Note: follow up from  and u/s   patient coming in at 1:30  
 [address] [address] [address] [address]  
  
    
 5/3/2019  8:00 AM  
FO[facility] UP 10 [facility] ([facility]) Appt Note: follow up yearly; follow up yearly [address] [id number] [phone] Upcoming Health Maintenance Date Due Pneumococcal 19-64 Medium Risk (1 of 1 - PPSV23) 1988 DTaP/Tdap/Td series (1 - Tdap) 1990 PAP AKA CERVICAL CYTOLOGY 1990 Influenza Age 5 to Adult 2018 BREAST CANCER SCRN MAMMOGRAM 2019 Allergies as of 2018  Review Complete On: 2018 By: Maren Argueta MD  
 No Known Allergies Current Immunizations  Never Reviewed No immunizations on file. Not reviewed this visit You Were Diagnosed With   
  
 Codes Comments Left lower quadrant pain    -  Primary ICD-10-CM: R10.32 
ICD-9-CM: 789.04 Colitis     ICD-10-CM: K52.9 ICD-9-CM: 558. 9 Vitals BP Pulse Resp Height(growth percentile) Weight(growth percentile) SpO2  
 124/80 (BP 1 Location: Left arm, BP Patient Position: Sitting) 74 16 (P) 5' 8.5\" (1.74 m) 261 lb 12.8 oz (118.8 kg) 98% BMI OB Status Smoking Status (P) 39.23 kg/m2 Premenopausal Never Smoker Vitals History BMI and BSA Data Body Mass Index Body Surface Area (P) 39.23 kg/m 2 (P) 2.4 m 2 Preferred Pharmacy Pharmacy Name Phone [address] [phone] , [address] AT [place] [address] [phone] Your Updated Medication List  
  
   
This list is accurate as of 7/13/18  2:40 PM.  Always use your most recent med list.  
  
  
  
  
 albuterol 90 mcg/actuation inhaler Commonly known as:  PROVENTIL HFA, VENTOLIN HFA, PROAIR HFA Take 2 Puffs by inhalation every six (6) hours as needed for Wheezing. calcium carbonate 400 mg Chew Commonly known as:  MYLANTA Take 1 Tab by mouth three (3) times daily (with meals). ciprofloxacin HCl 500 mg tablet Commonly known as:  CIPRO Take 1 Tab by mouth two (2) times a day. clobetasol 0.05 % topical cream  
Commonly known as:  TEMOVATE  
APPLY TOPICALLY BID PRN  
  
 MULTI-VITAMIN PO Take  by mouth daily. ZyrTEC 10 mg tablet Generic drug:  cetirizine Take  by mouth as needed. Prescriptions Sent to Pharmacy Refills  
 ciprofloxacin HCl (CIPRO) 500 mg tablet 0 Sig: Take 1 Tab by mouth two (2) times a day. Class: Normal  
 Pharmacy: [facility] [address] [place] [address] [address] Ph #: [phone] Route: Oral  
  
We Performed the Following AMB POC COMPLETE CBC,AUTOMATED ENTER [id number] CPT(R)] To-Do List   
 07/13/2018 Imaging:  CT ABD W CONT AND PELVIS W WO CONT Patient Instructions Colitis: Care Instructions Your Care Instructions Colitis is the medical term for swelling (inflammation) of the intestine. It can be caused by different things, such as an infection or loss of blood flow in the intestine. Other causes are problems like Crohn's disease or ulcerative colitis. Symptoms may include fever, diarrhea that may be bloody, or belly pain. Sometimes symptoms go away without treatment. But you may need treatment or more tests, such as blood tests or a stool test. Or you may need imaging tests like a CT scan or a colonoscopy. In some cases, the doctor may want to test a sample of tissue from the intestine. This test is called a biopsy. The doctor has checked you carefully, but problems can develop later. If you notice any problems or new symptoms, get medical treatment right away. Follow-up care is a key part of your treatment and safety. Be sure to make and go to all appointments, and call your doctor if you are having problems. It's also a good idea to know your test results and keep a list of the medicines you take. How can you care for yourself at home? · Rest until you feel better. · Your doctor may recommend that you eat bland foods. These include rice, dry toast or crackers, bananas, and applesauce. · To prevent dehydration, drink plenty of fluids. Choose water and other caffeine-free clear liquids until you feel better. If you have kidney, heart, or liver disease and have to limit fluids, talk with your doctor before you increase the amount of fluids you drink. · Be safe with medicines. Take your medicines exactly as prescribed. Call your doctor if you think you are having a problem with your medicine. You will get more details on the specific medicines your doctor prescribes. When should you call for help? Call 911 anytime you think you may need emergency care. For example, call if: 
  · You passed out (lost consciousness).  
  · Your stools are maroon or very bloody.  
 Call your doctor now or seek immediate medical care if: 
  · You have new or worse belly pain.  
  · You have a fever.  
  · You are vomiting.  
  · You cannot pass stools or gas.  
  · You have new or more blood in your stools.  
 Watch closely for changes in your health, and be sure to contact your doctor if: 
  · You have new or worse symptoms.   · You are losing weight.  
  · You do not get better as expected. Where can you learn more? Go to http://ravindra-bobby.info/. Katherine Rainey in the search box to learn more about \"Colitis: Care Instructions. \" Current as of: May 12, 2017 Content Version: 11.7 © 1601-2370 [employer]. Care instructions adapted under license by [employer] (which disclaims liability or warranty for this information). If you have questions about a medical condition or this instruction, always ask your healthcare professional. [address] any warranty or liability for your use of this information. Patient Instructions History Introducing [facility]! [facility] introduces Meridium patient portal. Now you can access parts of your medical record, email your doctor's office, and request medication refills online. 1. In your internet browser, go to [email or web address] 2. Click on the First Time User? Click Here link in the Sign In box. You will see the New Member Sign Up page. 3. Enter your Meridium Access Code exactly as it appears below. You will not need to use this code after youve completed the sign-up process. If you do not sign up before the expiration date, you must request a new code. · Meridium Access Code: [email or web address] Expires: 10/10/2018  1:27 PM 
 
4. Enter the last four digits of your Social Security Number (xxxx) and Date of Birth (mm/dd/yyyy) as indicated and click Submit. You will be taken to the next sign-up page. 5. Create a Art Circlet ID. This will be your Meridium login ID and cannot be changed, so think of one that is secure and easy to remember. 6. Create a [employer] password. You can change your password at any time. 7. Enter your Password Reset Question and Answer. This can be used at a later time if you forget your password. 8. Enter your e-mail address.  You will receive e-mail notification when new information is available in [employer]. 9. Click Sign Up. You can now view and download portions of your medical record. 10. Click the Download Summary menu link to download a portable copy of your medical information. If you have questions, please visit the Frequently Asked Questions section of the [employer] website. Remember, [employer] is NOT to be used for urgent needs. For medical emergencies, dial 911. Now available from your iPhone and Android! Please provide this summary of care documentation to your next provider. Your primary care clinician is listed as Emilie. If you have any questions after today's visit, please call [phone].

## 2018-07-16 ENCOUNTER — TELEPHONE (OUTPATIENT)
Dept: INTERNAL MEDICINE CLINIC | Age: 48
End: 2018-07-16

## 2018-07-16 ENCOUNTER — HOSPITAL ENCOUNTER (OUTPATIENT)
Dept: CT IMAGING | Age: 48
Discharge: HOME | End: 2018-07-16
Attending: INTERNAL MEDICINE
Payer: COMMERCIAL

## 2018-07-16 DIAGNOSIS — R10.32 LEFT LOWER QUADRANT PAIN: ICD-10-CM

## 2018-07-16 PROCEDURE — 74177 CT ABD & PELVIS W/CONTRAST: CPT

## 2018-07-16 PROCEDURE — 74011250636 HC RX REV CODE- 250/636: Performed by: INTERNAL MEDICINE

## 2018-07-16 PROCEDURE — 74011636320 HC RX REV CODE- 636/320: Performed by: INTERNAL MEDICINE

## 2018-07-16 RX ORDER — SODIUM CHLORIDE 0.9 % (FLUSH) 0.9 %
10 SYRINGE (ML) INJECTION
Status: COMPLETED | OUTPATIENT
Start: 2018-07-16 | End: 2018-07-16

## 2018-07-16 RX ORDER — SODIUM CHLORIDE 9 MG/ML
50 INJECTION, SOLUTION INTRAVENOUS
Status: COMPLETED | OUTPATIENT
Start: 2018-07-16 | End: 2018-07-16

## 2018-07-16 RX ADMIN — SODIUM CHLORIDE 50 ML/HR: 900 INJECTION, SOLUTION INTRAVENOUS at 15:53

## 2018-07-16 RX ADMIN — Medication 10 ML: at 15:53

## 2018-07-16 RX ADMIN — IOHEXOL 20 ML: 240 INJECTION, SOLUTION INTRATHECAL; INTRAVASCULAR; INTRAVENOUS; ORAL at 15:54

## 2018-07-16 RX ADMIN — IOPAMIDOL 100 ML: 755 INJECTION, SOLUTION INTRAVENOUS at 15:53

## 2018-07-16 NOTE — TELEPHONE ENCOUNTER
----- Message from Maren Argueta MD sent at 7/13/2018  6:02 PM EDT -----  [address] blood count is mildly elevated consistent with infection for which I placed on Cipro so we will recheck that on follow-up.

## 2018-07-17 ENCOUNTER — OFFICE VISIT (OUTPATIENT)
Dept: INTERNAL MEDICINE CLINIC | Age: 48
End: 2018-07-17

## 2018-07-17 VITALS
OXYGEN SATURATION: 98 % | DIASTOLIC BLOOD PRESSURE: 82 MMHG | HEIGHT: 69 IN | WEIGHT: 262.2 LBS | BODY MASS INDEX: 38.83 KG/M2 | RESPIRATION RATE: 19 BRPM | SYSTOLIC BLOOD PRESSURE: 120 MMHG | HEART RATE: 67 BPM

## 2018-07-17 DIAGNOSIS — R10.32 LEFT LOWER QUADRANT PAIN: ICD-10-CM

## 2018-07-17 DIAGNOSIS — K52.9 COLITIS: Primary | ICD-10-CM

## 2018-07-17 NOTE — PATIENT INSTRUCTIONS
Colitis: Care Instructions Your Care Instructions Colitis is the medical term for swelling (inflammation) of the intestine. It can be caused by different things, such as an infection or loss of blood flow in the intestine. Other causes are problems like Crohn's disease or ulcerative colitis. Symptoms may include fever, diarrhea that may be bloody, or belly pain. Sometimes symptoms go away without treatment. But you may need treatment or more tests, such as blood tests or a stool test. Or you may need imaging tests like a CT scan or a colonoscopy. In some cases, the doctor may want to test a sample of tissue from the intestine. This test is called a biopsy. The doctor has checked you carefully, but problems can develop later. If you notice any problems or new symptoms, get medical treatment right away. Follow-up care is a key part of your treatment and safety. Be sure to make and go to all appointments, and call your doctor if you are having problems. It's also a good idea to know your test results and keep a list of the medicines you take. How can you care for yourself at home? · Rest until you feel better. · Your doctor may recommend that you eat bland foods. These include rice, dry toast or crackers, bananas, and applesauce. · To prevent dehydration, drink plenty of fluids. Choose water and other caffeine-free clear liquids until you feel better. If you have kidney, heart, or liver disease and have to limit fluids, talk with your doctor before you increase the amount of fluids you drink. · Be safe with medicines. Take your medicines exactly as prescribed. Call your doctor if you think you are having a problem with your medicine. You will get more details on the specific medicines your doctor prescribes. When should you call for help? Call 911 anytime you think you may need emergency care. For example, call if: 
  · You passed out (lost consciousness).  
  · Your stools are maroon or very bloody.  Call your doctor now or seek immediate medical care if: 
  · You have new or worse belly pain.  
  · You have a fever.  
  · You are vomiting.  
  · You cannot pass stools or gas.  
  · You have new or more blood in your stools.  
 Watch closely for changes in your health, and be sure to contact your doctor if: 
  · You have new or worse symptoms.  
  · You are losing weight.  
  · You do not get better as expected. Where can you learn more? Go to http://ravindra-bobby.info/. Aydin Thompson in the search box to learn more about \"Colitis: Care Instructions. \" Current as of: May 12, 2017 Content Version: 11.7 © 7383-6565 [employer]. Care instructions adapted under license by [employer] (which disclaims liability or warranty for this information). If you have questions about a medical condition or this instruction, always ask your healthcare professional. [address] any warranty or liability for your use of this information.

## 2018-07-17 NOTE — PROGRESS NOTES
Chief Complaint   Patient presents with    Other     CT Scan        SUBJECTIVE:    Shyanne Bland is a 49 y.o. female who returns today in follow-up of her abdominal pain were presumed colitis. She was seen here on the 13th at which time she was placed on Cipro and scheduled for CT scan. She has she notes the abdominal pain is decreased. She notes no problems with fevers or chills. She is noted no other GI complaints. She did have a CT scan done which has revealed diverticulosis without active diverticulitis. Current Outpatient Prescriptions   Medication Sig Dispense Refill    ciprofloxacin HCl (CIPRO) 500 mg tablet Take 1 Tab by mouth two (2) times a day. 20 Tab 0    calcium carbonate (MYLANTA) 400 mg chew Take 1 Tab by mouth three (3) times daily (with meals).  clobetasol (TEMOVATE) 0.05 % topical cream APPLY TOPICALLY BID PRN 60 g 5    albuterol (PROVENTIL HFA, VENTOLIN HFA, PROAIR HFA) 90 mcg/actuation inhaler Take 2 Puffs by inhalation every six (6) hours as needed for Wheezing. 1 Inhaler 1    cetirizine (ZYRTEC) 10 mg tablet Take  by mouth as needed.  MULTIVITAMINS (MULTI-VITAMIN PO) Take  by mouth daily.        Past Medical History:   Diagnosis Date    Asthma     Edema of extremities 7/27/2010    Incontinence 7/27/2010    Indigestion 7/27/2010    Joint pain 7/27/2010    Morbid obesity ([address] 7/27/2010    Reflux 7/27/2010     Past Surgical History:   Procedure Laterality Date    ABDOMEN SURGERY PROC UNLISTED      Lap band '08, hernia    HX ORTHOPAEDIC      trigger finger    LAP, PLACE ADJUST GASTR BAND  12/4/08     No Known Allergies    REVIEW OF SYSTEMS:  General: negative for - chills or fever, or weight loss or gain  ENT: negative for - headaches, nasal congestion or tinnitus  Eyes: no blurred or visual changes  Neck: No stiffness or swollen nodes  Respiratory: negative for - cough, hemoptysis, shortness of breath or wheezing  Cardiovascular : negative for - chest pain, edema, palpitations or shortness of breath  Gastrointestinal: negative for -  blood in stools, heartburn or nausea/vomiting. Positive abdominal pain present but decreased since last seen on 7/13  Genito-Urinary: no dysuria, trouble voiding, or hematuria  Musculoskeletal: negative for - gait disturbance, joint pain, joint stiffness or joint swelling  Neurological: no TIA or stroke symptoms  Hematologic: no bruises, no bleeding  Lymphatic: no swollen glands  Integument: no lumps, mole changes, nail changes or rash  Endocrine:no malaise/lethargy poly uria or polydipsia or unexpected weight changes        Social History     Social History    Marital status:      Spouse name: N/A    Number of children: N/A    Years of education: N/A     Social History Main Topics    Smoking status: Never Smoker    Smokeless tobacco: Never Used    Alcohol use Yes      Comment: socially    Drug use: No    Sexual activity: Yes     Partners: Male     Birth control/ protection: None     Other Topics Concern    None     Social History Narrative     Family History   Problem Relation Age of Onset    Hypertension Mother    [place] Other Mother 76     aneurysm in her head    High Cholesterol Father     Heart Disease Father        OBJECTIVE:     Visit Vitals    /82 (BP 1 Location: Left arm, BP Patient Position: Sitting)    Pulse 67    Resp 19    Ht 5' 8.5\" (1.74 m)    Wt 262 lb 3.2 oz (118.9 kg)    SpO2 98%    BMI 39.29 kg/m2     CONSTITUTIONAL:   well nourished, appears age appropriate  EYES: sclera anicteric, PERRL, EOMI  ENMT:nares clear, moist mucous membranes, pharynx clear  NECK: supple.  Thyroid normal, No JVD or bruits  RESPIRATORY: Chest: clear to ascultation and percussion, normal inspiratory effort  CARDIOVASCULAR: Heart: regular rate and rhythm no murmurs, rubs or gallops, PMI not displaced, No thrills  GASTROINTESTINAL: Abdomen: non distended, soft, non tender except to deep palpation left lower quadrant, bowel sounds normal  HEMATOLOGIC: no purpura, petechiae or bruising  LYMPHATIC: No lymph node enlargemant  MUSCULOSKELETAL: Extremities: no edema or active synovitis, pulse 1+   INTEGUMENT: No unusual rashes or suspicious skin lesions noted. Nails appear normal.  PERIPHERAL VASCULAR: normal pulses femoral, PT and DP  NEUROLOGIC: non-focal exam, A & O X 3  PSYCHIATRIC:, appropriate affect     ASSESSMENT:   1. Colitis    2. Left lower quadrant pain      Impression  1. Persistent colitis I think she is clinically better but not resolved at this point I am going to continue with the Cipro to get her back in 10 days to make sure symptoms have entirely resolved. If she persists with symptoms at that time and I think we probably need to proceed with a colonoscopy. 2.  Asthma and as best I can tell reviewing her chart she has never had a Pneumovax which we discussed. She wants to defer that until her follow-up. Follow-up in 10 days. PLAN:  . No orders of the defined types were placed in this encounter. ATTENTION:   This medical record was transcribed using an electronic medical records system. Although proofread, it may and can contain electronic and spelling errors. Other human spelling and other errors may be present. Corrections may be executed at a later time. Please feel free to contact us for any clarifications as needed. Follow-up Disposition:  Return in about 10 days (around 7/27/2018). No results found for any visits on 07/17/18. Loreto Recio MD    The patient verbalized understanding of the problems and plans as explained.

## 2018-07-17 NOTE — MR AVS SNAPSHOT
[facility] 
 
 
 [address] [id number] [phone] Patient: Dora Nina MRN: [id number] :1970 Visit Information Date & Time Provider Department Dept. Phone Encounter #  
 2018  2:20 PM Princess Fritz [facility] [phone] [id number] Follow-up Instructions Return in about 10 days (around 2018). Your Appointments 2018 11:00 AM  
FOLLOW UP 10 with MD ALVINO Cummins [facility] ([facility]) Appt Note: 10 day follow up [address] [address] [address] [facility] [id number]  
  
    
 5/3/2019  8:00 AM  
FOLLOW UP 10 with MD ALVINO Cummins [facility] ([facility]) Appt Note: follow up yearly; follow up yearly [address] [address] [phone] Upcoming Health Maintenance Date Due Pneumococcal 19-64 Medium Risk (1 of 1 - PPSV23) 1988 DTaP/Tdap/Td series (1 - Tdap) 1990 PAP AKA CERVICAL CYTOLOGY 1990 Influenza Age 5 to Adult 2018 BREAST CANCER SCRN MAMMOGRAM 2019 Allergies as of 2018  Review Complete On: 2018 By: Eliseo Lord MA No Known Allergies Current Immunizations  Never Reviewed No immunizations on file. Not reviewed this visit You Were Diagnosed With   
  
 Codes Comments Colitis    -  Primary ICD-10-CM: K52.9 ICD-9-CM: 558. 9 Left lower quadrant pain     ICD-10-CM: R10.32 
ICD-9-CM: 789.04 Vitals BP Pulse Resp Height(growth percentile) Weight(growth percentile) SpO2  
 120/82 (BP 1 Location: Left arm, BP Patient Position: Sitting) 67 19 5' 8.5\" (1.74 m) 262 lb 3.2 oz (118.9 kg) 98% BMI OB Status Smoking Status 39.29 kg/m2 Premenopausal Never Smoker Vitals History BMI and BSA Data Body Mass Index Body Surface Area  
 39.29 kg/m 2 2.4 m 2 Preferred Pharmacy Pharmacy Name Phone [facility] [phone] [phone], [address] AT [facility] [address] [phone] Your Updated Medication List  
  
   
This list is accurate as of 7/17/18  3:37 PM.  Always use your most recent med list.  
  
  
  
  
 albuterol 90 mcg/actuation inhaler Commonly known as:  PROVENTIL HFA, VENTOLIN HFA, PROAIR HFA Take 2 Puffs by inhalation every six (6) hours as needed for Wheezing. calcium carbonate 400 mg Chew Commonly known as:  MYLANTA Take 1 Tab by mouth three (3) times daily (with meals). ciprofloxacin HCl 500 mg tablet Commonly known as:  CIPRO Take 1 Tab by mouth two (2) times a day. clobetasol 0.05 % topical cream  
Commonly known as:  TEMOVATE  
APPLY TOPICALLY BID PRN  
  
 MULTI-VITAMIN PO Take  by mouth daily. ZyrTEC 10 mg tablet Generic drug:  cetirizine Take  by mouth as needed. Follow-up Instructions Return in about 10 days (around 7/27/2018). Patient Instructions Colitis: Care Instructions Your Care Instructions Colitis is the medical term for swelling (inflammation) of the intestine. It can be caused by different things, such as an infection or loss of blood flow in the intestine. Other causes are problems like Crohn's disease or ulcerative colitis. Symptoms may include fever, diarrhea that may be bloody, or belly pain. Sometimes symptoms go away without treatment. But you may need treatment or more tests, such as blood tests or a stool test. Or you may need imaging tests like a CT scan or a colonoscopy. In some cases, the doctor may want to test a sample of tissue from the intestine. This test is called a biopsy. The doctor has checked you carefully, but problems can develop later.  If you notice any problems or new symptoms, get medical treatment right away. Follow-up care is a key part of your treatment and safety. Be sure to make and go to all appointments, and call your doctor if you are having problems. It's also a good idea to know your test results and keep a list of the medicines you take. How can you care for yourself at home? · Rest until you feel better. · Your doctor may recommend that you eat bland foods. These include rice, dry toast or crackers, bananas, and applesauce. · To prevent dehydration, drink plenty of fluids. Choose water and other caffeine-free clear liquids until you feel better. If you have kidney, heart, or liver disease and have to limit fluids, talk with your doctor before you increase the amount of fluids you drink. · Be safe with medicines. Take your medicines exactly as prescribed. Call your doctor if you think you are having a problem with your medicine. You will get more details on the specific medicines your doctor prescribes. When should you call for help? Call 911 anytime you think you may need emergency care. For example, call if: 
  · You passed out (lost consciousness).  
  · Your stools are maroon or very bloody.  
 Call your doctor now or seek immediate medical care if: 
  · You have new or worse belly pain.  
  · You have a fever.  
  · You are vomiting.  
  · You cannot pass stools or gas.  
  · You have new or more blood in your stools.  
 Watch closely for changes in your health, and be sure to contact your doctor if: 
  · You have new or worse symptoms.  
  · You are losing weight.  
  · You do not get better as expected. Where can you learn more? Go to http://ravindra-bobby.info/. Eliseo Colin in the search box to learn more about \"Colitis: Care Instructions. \" Current as of: May 12, 2017 Content Version: 11.7 © 1752-8612 [employer] Incorporated.  Care instructions adapted under license by [address] (which disclaims liability or warranty for this information). If you have questions about a medical condition or this instruction, always ask your healthcare professional. [address] any warranty or liability for your use of this information. Introducing [facility]! Jim Nagel introduces TheFormTool patient portal. Now you can access parts of your medical record, email your doctor's office, and request medication refills online. 1. In your internet browser, go to [email or web address] 2. Click on the First Time User? Click Here link in the Sign In box. You will see the New Member Sign Up page. 3. Enter your TheFormTool Access Code exactly as it appears below. You will not need to use this code after youve completed the sign-up process. If you do not sign up before the expiration date, you must request a new code. · TheFormTool Access Code: [email or web address] Expires: 10/10/2018  1:27 PM 
 
4. Enter the last four digits of your Social Security Number (xxxx) and Date of Birth (mm/dd/yyyy) as indicated and click Submit. You will be taken to the next sign-up page. 5. Create a TheFormTool ID. This will be your TheFormTool login ID and cannot be changed, so think of one that is secure and easy to remember. 6. Create a TheFormTool password. You can change your password at any time. 7. Enter your Password Reset Question and Answer. This can be used at a later time if you forget your password. 8. Enter your e-mail address. You will receive e-mail notification when new information is available in [address] 9. Click Sign Up. You can now view and download portions of your medical record. 10. Click the Download Summary menu link to download a portable copy of your medical information. If you have questions, please visit the Frequently Asked Questions section of the TheFormTool website.  Remember, TheFormTool is NOT to be used for urgent needs. For medical emergencies, dial 911. Now available from your iPhone and Android! Please provide this summary of care documentation to your next provider. Your primary care clinician is listed as Emilie. If you have any questions after today's visit, please call [phone].

## 2018-07-17 NOTE — PROGRESS NOTES
Chief Complaint   Patient presents with    Other     CT Scan      1. Have you been to the ER, urgent care clinic since your last visit? Hospitalized since your last visit? No    2. Have you seen or consulted any other health care providers outside of the [address] [place] [address] since your last visit? Include any pap smears or colon screening.  No

## 2018-07-26 ENCOUNTER — OFFICE VISIT (OUTPATIENT)
Dept: INTERNAL MEDICINE CLINIC | Age: 48
End: 2018-07-26

## 2018-07-26 VITALS
OXYGEN SATURATION: 97 % | DIASTOLIC BLOOD PRESSURE: 80 MMHG | BODY MASS INDEX: 38.8 KG/M2 | WEIGHT: 262 LBS | TEMPERATURE: 98.9 F | HEART RATE: 74 BPM | HEIGHT: 69 IN | RESPIRATION RATE: 18 BRPM | SYSTOLIC BLOOD PRESSURE: 126 MMHG

## 2018-07-26 DIAGNOSIS — J45.20 MILD INTERMITTENT ASTHMA WITHOUT COMPLICATION: ICD-10-CM

## 2018-07-26 DIAGNOSIS — R10.32 LEFT LOWER QUADRANT PAIN: Primary | ICD-10-CM

## 2018-07-26 DIAGNOSIS — Z23 ENCOUNTER FOR IMMUNIZATION: ICD-10-CM

## 2018-07-26 DIAGNOSIS — E66.01 MORBID OBESITY (HCC): ICD-10-CM

## 2018-07-26 NOTE — MR AVS SNAPSHOT
[facility] 
 
 
 [address] [address] [phone] Patient: Dora Nina MRN: [id number] :1970 Visit Information Date & Time Provider Department Dept. Phone Encounter #  
 2018  8:50 AM Princess Lam MD [facility] [phone] [id number] Your Appointments 5/3/2019  8:00 AM  
FOLLOW UP 10 with MD ALVINO Cummins [facility] ([facility]) Appt Note: follow up yearly; follow up yearly [address] [address] [address] [facility] [address] [address] Upcoming Health Maintenance Date Due Pneumococcal 19-64 Medium Risk (1 of 1 - PPSV23) 1988 DTaP/Tdap/Td series (1 - Tdap) 1990 PAP AKA CERVICAL CYTOLOGY 1990 Influenza Age 5 to Adult 2018 BREAST CANCER SCRN MAMMOGRAM 2019 Allergies as of 2018  Review Complete On: 2018 By: Princess Lam MD  
 No Known Allergies Current Immunizations  Never Reviewed Name Date Pneumococcal Polysaccharide (PPSV-23)  Incomplete Not reviewed this visit You Were Diagnosed With   
  
 Codes Comments Left lower quadrant pain    -  Primary ICD-10-CM: R10.32 
ICD-9-CM: 789.04 Morbid obesity ([facility] [address]     ICD-10-CM: E66.01 
ICD-9-CM: 278.01 Mild intermittent asthma without complication     [facility]-81-AA: J45.20 ICD-9-CM: 493.90 Encounter for immunization     ICD-10-CM: U26 ICD-9-CM: V03.89 Vitals BP Pulse Temp Resp Height(growth percentile) Weight(growth percentile) 126/80 (BP 1 Location: Left arm, BP Patient Position: Sitting) 74 98.9 °F (37.2 °C) (Oral) 18 5' 8.5\" (1.74 m) 262 lb (118.8 kg) SpO2 BMI OB Status Smoking Status 97% 39.26 kg/m2 Premenopausal Never Smoker BMI and BSA Data Body Mass Index Body Surface Area 39.26 kg/m 2 2.4 m 2 Preferred Pharmacy Pharmacy Name Phone [facility] [phone] [phone], [address] AT [place] [address] [phone] Your Updated Medication List  
  
   
This list is accurate as of 7/26/18  9:21 AM.  Always use your most recent med list.  
  
  
  
  
 albuterol 90 mcg/actuation inhaler Commonly known as:  PROVENTIL HFA, VENTOLIN HFA, PROAIR HFA Take 2 Puffs by inhalation every six (6) hours as needed for Wheezing. calcium carbonate 400 mg Chew Commonly known as:  MYLANTA Take 1 Tab by mouth three (3) times daily (with meals). clobetasol 0.05 % topical cream  
Commonly known as:  TEMOVATE  
APPLY TOPICALLY BID PRN  
  
 MULTI-VITAMIN PO Take  by mouth daily. OTHER  
5 Tabs by SubLINGual route daily. Take 5 tablets under tongue for 7 days ZyrTEC 10 mg tablet Generic drug:  cetirizine Take  by mouth as needed. We Performed the Following PNEUMOCOCCAL POLYSACCHARIDE VACCINE, 23-VALENT, ADULT OR IMMUNOSUPPRESSED PT DOSE, [00115 CPT(R)] NC IMMUNIZ ADMIN,1 SINGLE/COMB VAC/TOXOID [id number] CPT(R)] Patient Instructions Asthma in Adults: Care Instructions Your Care Instructions During an asthma attack, your airways swell and narrow as a reaction to certain things (triggers). This makes it hard to breathe. You may be able to prevent asthma attacks if you avoid the things that set off your asthma symptoms. Keeping your asthma under control and treating symptoms before they get bad can help you avoid severe attacks. If you can control your asthma, you may be able to do all of your normal daily activities. You may also avoid asthma attacks and trips to the hospital. 
Follow-up care is a key part of your treatment and safety.  Be sure to make and go to all appointments, and call your doctor if you are having problems. It's also a good idea to know your test results and keep a list of the medicines you take. How can you care for yourself at home? · Follow your asthma action plan so you can manage your symptoms at home. An asthma action plan will help you prevent and control airway reactions and will tell you what to do during an asthma attack. If you do not have an asthma action plan, work with your doctor to build one. · Take your asthma medicine exactly as prescribed. Medicine plays an important role in controlling asthma. Talk to your doctor right away if you have any questions about what to take and how to take it. ¨ Use your quick-relief medicine when you have symptoms of an attack. Quick-relief medicine often is an albuterol inhaler. Some people need to use quick-relief medicine before they exercise. ¨ Take your controller medicine every day, not just when you have symptoms. Controller medicine is usually an inhaled corticosteroid. The goal is to prevent problems before they occur. Do not use your controller medicine to try to treat an attack that has already started. It does not work fast enough to help. ¨ If your doctor prescribed corticosteroid pills to use during an attack, take them as directed. They may take hours to work, but they may shorten the attack and help you breathe better. ¨ Keep your quick-relief medicine with you at all times. · Talk to your doctor before using other medicines. Some medicines, such as aspirin, can cause asthma attacks in some people. · Check yourself for asthma symptoms to know which step to follow in your action plan. Watch for things like being short of breath, having chest tightness, coughing, and wheezing. Also notice if symptoms wake you up at night or if you get tired quickly when you exercise. · If you have a peak flow meter, use it to check how well you are breathing.  This can help you predict when an asthma attack is going to occur. Then you can take medicine to prevent the asthma attack or make it less severe. · See your doctor regularly. These visits will help you learn more about asthma and what you can do to control it. Your doctor will monitor your treatment to make sure the medicine is helping you. · Keep track of your asthma attacks and your treatment. After you have had an attack, write down what triggered it, what helped end it, and any concerns you have about your asthma action plan. Take your diary when you see your doctor. You can then review your asthma action plan and decide if it is working. · Do not smoke or allow others to smoke around you. Avoid smoky places. Smoking makes asthma worse. If you need help quitting, talk to your doctor about stop-smoking programs and medicines. These can increase your chances of quitting for good. · Learn what triggers an asthma attack for you, and avoid the triggers when you can. Common triggers include colds, smoke, air pollution, dust, pollen, mold, pets, cockroaches, stress, and cold air. · Avoid colds and the flu. Get a pneumococcal vaccine shot. If you have had one before, ask your doctor whether you need a second dose. Get a flu vaccine every fall. If you must be around people with colds or the flu, wash your hands often. When should you call for help? Call 911 anytime you think you may need emergency care. For example, call if: 
  · You have severe trouble breathing.  
 Call your doctor now or seek immediate medical care if: 
  · Your symptoms do not get better after you have followed your asthma action plan.  
  · You cough up yellow, dark brown, or bloody mucus (sputum).  
 Watch closely for changes in your health, and be sure to contact your doctor if: 
  · Your coughing and wheezing get worse.  
  · You need to use quick-relief medicine on more than 2 days a week (unless it is just for exercise).  
  · You need help figuring out what is triggering your asthma attacks. Where can you learn more? Go to http://ravindra-bobby.info/. Enter P597 in the search box to learn more about \"Asthma in Adults: Care Instructions. \" Current as of: December 6, 2017 Content Version: 11.7 © 4189-4812 [employer]. Care instructions adapted under license by [employer] (which disclaims liability or warranty for this information). If you have questions about a medical condition or this instruction, always ask your healthcare professional. [address] any warranty or liability for your use of this information. Introducing [facility]! Brett Gonzalezdane introduces Smashrun patient portal. Now you can access parts of your medical record, email your doctor's office, and request medication refills online. 1. In your internet browser, go to [email or web address] 2. Click on the First Time User? Click Here link in the Sign In box. You will see the New Member Sign Up page. 3. Enter your Smashrun Access Code exactly as it appears below. You will not need to use this code after youve completed the sign-up process. If you do not sign up before the expiration date, you must request a new code. · Smashrun Access Code: [email or web address] Expires: 10/10/2018  1:27 PM 
 
4. Enter the last four digits of your Social Security Number (xxxx) and Date of Birth (mm/dd/yyyy) as indicated and click Submit. You will be taken to the next sign-up page. 5. Create a [employer]t ID. This will be your Smashrun login ID and cannot be changed, so think of one that is secure and easy to remember. 6. Create a Smashrun password. You can change your password at any time. 7. Enter your Password Reset Question and Answer. This can be used at a later time if you forget your password. 8. Enter your e-mail address. You will receive e-mail notification when new information is available in [address] 9. Click Sign Up. You can now view and download portions of your medical record. 10. Click the Download Summary menu link to download a portable copy of your medical information. If you have questions, please visit the Frequently Asked Questions section of the [employer] website. Remember, [employer]let is NOT to be used for urgent needs. For medical emergencies, dial 911. Now available from your iPhone and Android! Please provide this summary of care documentation to your next provider. Your primary care clinician is listed as Emilie. If you have any questions after today's visit, please call [phone].

## 2018-07-26 NOTE — PROGRESS NOTES
Chief Complaint   Patient presents with    Abdominal Pain     10 day follow up       SUBJECTIVE:    Liat Parra is a 49 y.o. female who returns in follow-up for abdominal pain presumed to be colitis for which we treated with Cipro and Cipro has taking care of the problem. A CT scan did reveal some diverticulosis without active diverticulitis. Her symptoms have now resolved. She denies any further GI complaints. She denies any cardiorespiratory complaint. There are no other complaints on complete review of systems. She has now completed the Cipro. Current Outpatient Prescriptions   Medication Sig Dispense Refill    OTHER 5 Tabs by SubLINGual route daily. Take 5 tablets under tongue for 7 days      clobetasol (TEMOVATE) 0.05 % topical cream APPLY TOPICALLY BID PRN 60 g 5    MULTIVITAMINS (MULTI-VITAMIN PO) Take  by mouth daily.  calcium carbonate (MYLANTA) 400 mg chew Take 1 Tab by mouth three (3) times daily (with meals).  albuterol (PROVENTIL HFA, VENTOLIN HFA, PROAIR HFA) 90 mcg/actuation inhaler Take 2 Puffs by inhalation every six (6) hours as needed for Wheezing. 1 Inhaler 1    cetirizine (ZYRTEC) 10 mg tablet Take  by mouth as needed.        Past Medical History:   Diagnosis Date    Asthma     Edema of extremities 7/27/2010    Incontinence 7/27/2010    Indigestion 7/27/2010    Joint pain 7/27/2010    Morbid obesity ([address] 7/27/2010    Reflux 7/27/2010     Past Surgical History:   Procedure Laterality Date    ABDOMEN SURGERY PROC UNLISTED      Lap band '08, hernia    HX ORTHOPAEDIC      trigger finger    LAP, PLACE ADJUST GASTR BAND  12/4/08     No Known Allergies    REVIEW OF SYSTEMS:  General: negative for - chills or fever, or weight loss or gain  ENT: negative for - headaches, nasal congestion or tinnitus  Eyes: no blurred or visual changes  Neck: No stiffness or swollen nodes  Respiratory: negative for - cough, hemoptysis, shortness of breath or wheezing  Cardiovascular : negative for - chest pain, edema, palpitations or shortness of breath  Gastrointestinal: negative for - abdominal pain, blood in stools, heartburn or nausea/vomiting  Genito-Urinary: no dysuria, trouble voiding, or hematuria  Musculoskeletal: negative for - gait disturbance, joint pain, joint stiffness or joint swelling  Neurological: no TIA or stroke symptoms  Hematologic: no bruises, no bleeding  Lymphatic: no swollen glands  Integument: no lumps, mole changes, nail changes or rash  Endocrine:no malaise/lethargy poly uria or polydipsia or unexpected weight changes        Social History     Social History    Marital status:      Spouse name: N/A    Number of children: N/A    Years of education: N/A     Social History Main Topics    Smoking status: Never Smoker    Smokeless tobacco: Never Used    Alcohol use Yes      Comment: socially    Drug use: No    Sexual activity: Yes     Partners: Male     Birth control/ protection: None     Other Topics Concern    None     Social History Narrative     Family History   Problem Relation Age of Onset    Hypertension Mother    [address] [facility] [address] Other Mother 76     aneurysm in her head    High Cholesterol Father     Heart Disease Father        OBJECTIVE:     Visit Vitals    /80 (BP 1 Location: Left arm, BP Patient Position: Sitting)    Pulse 74    Temp 98.9 °F (37.2 °C) (Oral)    Resp 18    Ht 5' 8.5\" (1.74 m)    Wt 262 lb (118.8 kg)    SpO2 97%    BMI 39.26 kg/m2     CONSTITUTIONAL:   well nourished, appears age appropriate  EYES: sclera anicteric, PERRL, EOMI  ENMT:nares clear, moist mucous membranes, pharynx clear  NECK: supple.  Thyroid normal, No JVD or bruits  RESPIRATORY: Chest: clear to ascultation and percussion, normal inspiratory effort  CARDIOVASCULAR: Heart: regular rate and rhythm no murmurs, rubs or gallops, PMI not displaced, No thrills  GASTROINTESTINAL: Abdomen: non distended, soft, non tender, bowel sounds normal  HEMATOLOGIC: no purpura, petechiae or bruising  LYMPHATIC: No lymph node enlargemant  MUSCULOSKELETAL: Extremities: no edema or active synovitis, pulse 1+   INTEGUMENT: No unusual rashes or suspicious skin lesions noted. Nails appear normal.  PERIPHERAL VASCULAR: normal pulses femoral, PT and DP  NEUROLOGIC: non-focal exam, A & O X 3  PSYCHIATRIC:, appropriate affect     ASSESSMENT:   1. Left lower quadrant pain    2. Morbid obesity ([address]    3. Mild intermittent asthma without complication    4. Encounter for immunization      Pression  1. Lower abdominal pain probably related to colitis that seems to have resolved at this point I do not think we need to evaluate further  2. Morbid obesity we discussed diet, exercise and weight reduction for overall health benefit  3. Asthma that is stable we did update immunization today with pneumococcal 23  I will recheck a myself as previously scheduled or sooner should there be a problem. PLAN:  .  Orders Placed This Encounter    PNEUMOCOCCAL POLYSACCHARIDE VACCINE, 23-VALENT, ADULT OR IMMUNOSUPPRESSED PT DOSE,    OTHER         ATTENTION:   This medical record was transcribed using an electronic medical records system. Although proofread, it may and can contain electronic and spelling errors. Other human spelling and other errors may be present. Corrections may be executed at a later time. Please feel free to contact us for any clarifications as needed. Follow-up Disposition:  Return for At previously scheduled appointment. No results found for any visits on 07/26/18. Ivy Siemens, MD    The patient verbalized understanding of the problems and plans as explained.

## 2018-07-26 NOTE — PATIENT INSTRUCTIONS

## 2018-07-26 NOTE — PROGRESS NOTES
Chief Complaint   Patient presents with    Abdominal Pain     10 day follow up     1. Have you been to the ER, urgent care clinic since your last visit? No    Hospitalized since your last visit? No     2. Have you seen or consulted any other health care providers outside of the [address] [place] [address] since your last visit?  No

## 2018-07-31 ENCOUNTER — OFFICE VISIT (OUTPATIENT)
Dept: INTERNAL MEDICINE CLINIC | Age: 48
End: 2018-07-31

## 2018-07-31 VITALS
HEART RATE: 72 BPM | DIASTOLIC BLOOD PRESSURE: 80 MMHG | BODY MASS INDEX: 38.95 KG/M2 | OXYGEN SATURATION: 97 % | RESPIRATION RATE: 20 BRPM | HEIGHT: 69 IN | SYSTOLIC BLOOD PRESSURE: 118 MMHG | WEIGHT: 263 LBS

## 2018-07-31 DIAGNOSIS — R10.32 LEFT LOWER QUADRANT PAIN: Primary | ICD-10-CM

## 2018-07-31 DIAGNOSIS — K52.9 COLITIS: ICD-10-CM

## 2018-07-31 RX ORDER — METRONIDAZOLE 500 MG/1
500 TABLET ORAL 3 TIMES DAILY
Qty: 30 TAB | Refills: 0 | Status: SHIPPED | OUTPATIENT
Start: 2018-07-31 | End: 2018-08-28 | Stop reason: ALTCHOICE

## 2018-07-31 NOTE — MR AVS SNAPSHOT
[address] [facility] 
 
 
 [address] [address] [phone] Patient: Suzie Bowles MRN: [id number] :1970 Visit Information Date & Time Provider Department Dept. Phone Encounter #  
 2018  3:30 PM Beverly Campo, [facility] [phone] [id number] Follow-up Instructions Return in about 2 weeks (around 2018). Follow-up and Disposition History Your Appointments 2018  2:30 PM  
FOLLOW UP 10 with MD CHRISTOPHER Medina [facility] ([facility]) Appt Note: 1 month follow up  for abd issues [address] [address] [address] [facility] [phone]  
  
    
 5/3/2019  8:00 AM  
FOLLOW UP 10 with MD ALVINO Medina [facility] ([facility]) Appt Note: follow up yearly; follow up yearly [address] [address] [phone] Upcoming Health Maintenance Date Due DTaP/Tdap/Td series (1 - Tdap) 1990 PAP AKA CERVICAL CYTOLOGY 1990 Influenza Age 5 to Adult 2018 BREAST CANCER SCRN MAMMOGRAM 2019 Allergies as of 2018  Review Complete On: 2018 By: Beverly Campo MD  
 No Known Allergies Current Immunizations  Never Reviewed Name Date Pneumococcal Polysaccharide (PPSV-23) 2018 Not reviewed this visit You Were Diagnosed With   
  
 Codes Comments Left lower quadrant pain    -  Primary ICD-10-CM: R10.32 
ICD-9-CM: 789.04 Colitis     ICD-10-CM: K52.9 ICD-9-CM: 558. 9 Vitals BP Pulse Resp Height(growth percentile) Weight(growth percentile) SpO2  
 118/80 (BP 1 Location: Left arm, BP Patient Position: Sitting) 72 20 5' 8.5\" (1.74 m) 263 lb (119.3 kg) 97% BMI OB Status Smoking Status 39.41 kg/m2 Premenopausal Never Smoker Vitals History BMI and BSA Data Body Mass Index Body Surface Area  
 39.41 kg/m 2 2.4 m 2 Preferred Pharmacy Pharmacy Name Phone [facility] [place] [phone] [phone] , [address] AT [place] [address] [phone] Your Updated Medication List  
  
   
This list is accurate as of 7/31/18  4:33 PM.  Always use your most recent med list.  
  
  
  
  
 albuterol 90 mcg/actuation inhaler Commonly known as:  PROVENTIL HFA, VENTOLIN HFA, PROAIR HFA Take 2 Puffs by inhalation every six (6) hours as needed for Wheezing. calcium carbonate 400 mg Chew Commonly known as:  MYLANTA Take 1 Tab by mouth three (3) times daily (with meals). clobetasol 0.05 % topical cream  
Commonly known as:  TEMOVATE  
APPLY TOPICALLY BID PRN  
  
 metroNIDAZOLE 500 mg tablet Commonly known as:  FLAGYL Take 1 Tab by mouth three (3) times daily. MULTI-VITAMIN PO Take  by mouth daily. OTHER  
5 Tabs by SubLINGual route daily. Take 5 tablets under tongue for 7 days ZyrTEC 10 mg tablet Generic drug:  cetirizine Take  by mouth as needed. Prescriptions Sent to Pharmacy Refills  
 metroNIDAZOLE (FLAGYL) 500 mg tablet 0 Sig: Take 1 Tab by mouth three (3) times daily. Class: Normal  
 Pharmacy: [facility] [address] [place] [address] [address] [facility] Ph #: [phone] Route: Oral  
  
We Performed the Following REFERRAL TO GASTROENTEROLOGY [JUK68 Custom] Comments:  
 41-year-old female with recurrent left lower quadrant abdominal pain initially improved with Cipro and there was recurred. CT scan diverticulosis without active-itis Follow-up Instructions Return in about 2 weeks (around 8/14/2018). Referral Information Referral ID Referred By Referred To [id number] Aubrey Castañeda MD   
   [facility] [address] [facility] [address] Phone: [phone] Fax: [phone] Visits Status Start Date End Date 1 New Request 7/31/18 7/31/19 If your referral has a status of pending review or denied, additional information will be sent to support the outcome of this decision. Patient Instructions Abdominal Pain: Care Instructions Your Care Instructions Abdominal pain has many possible causes. Some aren't serious and get better on their own in a few days. Others need more testing and treatment. If your pain continues or gets worse, you need to be rechecked and may need more tests to find out what is wrong. You may need surgery to correct the problem. Don't ignore new symptoms, such as fever, nausea and vomiting, urination problems, pain that gets worse, and dizziness. These may be signs of a more serious problem. Your doctor may have recommended a follow-up visit in the next 8 to 12 hours. If you are not getting better, you may need more tests or treatment. The doctor has checked you carefully, but problems can develop later. If you notice any problems or new symptoms, get medical treatment right away. Follow-up care is a key part of your treatment and safety. Be sure to make and go to all appointments, and call your doctor if you are having problems. It's also a good idea to know your test results and keep a list of the medicines you take. How can you care for yourself at home? · Rest until you feel better. · To prevent dehydration, drink plenty of fluids, enough so that your urine is light yellow or clear like water. Choose water and other caffeine-free clear liquids until you feel better. If you have kidney, heart, or liver disease and have to limit fluids, talk with your doctor before you increase the amount of fluids you drink. · If your stomach is upset, eat mild foods, such as rice, dry toast or crackers, bananas, and applesauce. Try eating several small meals instead of two or three large ones. · Wait until 48 hours after all symptoms have gone away before you have spicy foods, alcohol, and drinks that contain caffeine. · Do not eat foods that are high in fat. · Avoid anti-inflammatory medicines such as aspirin, ibuprofen (Advil, Motrin), and naproxen (Aleve). These can cause stomach upset. Talk to your doctor if you take daily aspirin for another health problem. When should you call for help? Call 911 anytime you think you may need emergency care. For example, call if: 
  · You passed out (lost consciousness).  
  · You pass maroon or very bloody stools.  
  · You vomit blood or what looks like coffee grounds.  
  · You have new, severe belly pain.  
 Call your doctor now or seek immediate medical care if: 
  · Your pain gets worse, especially if it becomes focused in one area of your belly.  
  · You have a new or higher fever.  
  · Your stools are black and look like tar, or they have streaks of blood.  
  · You have unexpected vaginal bleeding.  
  · You have symptoms of a urinary tract infection. These may include: 
¨ Pain when you urinate. ¨ Urinating more often than usual. 
¨ Blood in your urine.  
  · You are dizzy or lightheaded, or you feel like you may faint.  
 Watch closely for changes in your health, and be sure to contact your doctor if: 
  · You are not getting better after 1 day (24 hours). Where can you learn more? Go to http://ravindra-bobby.info/. Enter D332 in the search box to learn more about \"Abdominal Pain: Care Instructions. \" Current as of: November 20, 2017 Content Version: 11.7 © 0647-5452 [employer], [employer].  Care instructions adapted under license by [employer] (which disclaims liability or warranty for this information). If you have questions about a medical condition or this instruction, always ask your healthcare professional. [address] any warranty or liability for your use of this information. Patient Instructions History Introducing [address]! [facility] introduces Cyphort patient portal. Now you can access parts of your medical record, email your doctor's office, and request medication refills online. 1. In your internet browser, go to [email or web address] 2. Click on the First Time User? Click Here link in the Sign In box. You will see the New Member Sign Up page. 3. Enter your Cyphort Access Code exactly as it appears below. You will not need to use this code after youve completed the sign-up process. If you do not sign up before the expiration date, you must request a new code. · Cyphort Access Code: [email or web address] Expires: 10/10/2018  1:27 PM 
 
4. Enter the last four digits of your Social Security Number (xxxx) and Date of Birth (mm/dd/yyyy) as indicated and click Submit. You will be taken to the next sign-up page. 5. Create a Cyphort ID. This will be your Cyphort login ID and cannot be changed, so think of one that is secure and easy to remember. 6. Create a Cyphort password. You can change your password at any time. 7. Enter your Password Reset Question and Answer. This can be used at a later time if you forget your password. 8. Enter your e-mail address. You will receive e-mail notification when new information is available in [address] 9. Click Sign Up. You can now view and download portions of your medical record. 10. Click the Download Summary menu link to download a portable copy of your medical information. If you have questions, please visit the Frequently Asked Questions section of the Cyphort website. Remember, Cyphort is NOT to be used for urgent needs. For medical emergencies, dial 911. Now available from your iPhone and Android! Please provide this summary of care documentation to your next provider. Your primary care clinician is listed as Emilie. If you have any questions after today's visit, please call [phone].

## 2018-07-31 NOTE — PROGRESS NOTES
1. Have you been to the ER, urgent care clinic since your last visit? Hospitalized since your last visit? No 
 
2. Have you seen or consulted any other health care providers outside of the [address] [place] [address] since your last visit? Include any pap smears or colon screening. No  
 
 
Chief Complaint Patient presents with  Abdominal Pain  
  mild abdominal cramping like she previously had with diverticulitis

## 2018-07-31 NOTE — PROGRESS NOTES
Subjective:  
Lilo Plaza is a 49 y.o. female Chief Complaint Patient presents with  Abdominal Pain  
  mild abdominal cramping like she previously had with diverticulitis History of present illness: She returns noting increased left lower quadrant abdominal pain again as having been previously seen for that and felt to have colitis. Initially she was treated with Cipro and CT scan revealed some diverticulosis but without active inflammation or infection. She seem to be improved with the Cipro and I thought at that time things were getting better but now that she seems to Cipro symptoms have recurred again. She is noting discomfort when she has bowel movements. She notes no diarrhea. She has had no fevers or chills and no night sweats. She has no other particular complaints. Patient Active Problem List  
Diagnosis Code  Morbid obesity ([facility] E66.01  
 Incontinence R32  Joint pain M25.50  Gastroesophageal reflux disease without esophagitis K21.9  Indigestion K30  Edema of extremities R60.0  Primary osteoarthritis involving multiple joints M15.0  Acute laryngitis J04.0  Acute URI J06.9  Acute non-recurrent maxillary sinusitis J01.00  
 Mild intermittent asthma without complication Y81.08  Annual physical exam Z00.00  Left lower quadrant pain R10.32  
 Colitis K52.9 Past Medical History:  
Diagnosis Date  Asthma  Edema of extremities 7/27/2010  Incontinence 7/27/2010  Indigestion 7/27/2010  Joint pain 7/27/2010  Morbid obesity ([facility] 7/27/2010  Reflux 7/27/2010 No Known Allergies Family History Problem Relation Age of Onset  Hypertension Mother  Other Mother 76  
  aneurysm in her head  High Cholesterol Father  Heart Disease Father Social History Social History  Marital status:  Spouse name: N/A  
 Number of children: N/A  
 Years of education: N/A Occupational History  Not on file. Social History Main Topics  Smoking status: Never Smoker  Smokeless tobacco: Never Used  Alcohol use Yes Comment: socially  Drug use: No  
 Sexual activity: Yes  
  Partners: Male Birth control/ protection: None Other Topics Concern  Not on file Social History Narrative Prior to Admission medications Medication Sig Start Date End Date Taking? Authorizing Provider  
metroNIDAZOLE (FLAGYL) 500 mg tablet Take 1 Tab by mouth three (3) times daily. 7/31/18  Yes Delmi Dixon MD  
OTHER 5 Tabs by SubLINGual route daily. Take 5 tablets under tongue for 7 days   Yes Historical Provider  
calcium carbonate (MYLANTA) 400 mg chew Take 1 Tab by mouth three (3) times daily (with meals). Yes Historical Provider  
clobetasol (TEMOVATE) 0.05 % topical cream APPLY TOPICALLY BID PRN 6/25/18  Yes Delmi Dixon MD  
albuterol (PROVENTIL HFA, VENTOLIN HFA, PROAIR HFA) 90 mcg/actuation inhaler Take 2 Puffs by inhalation every six (6) hours as needed for Wheezing. 12/1/17  Yes Delmi Dixon MD  
cetirizine (ZYRTEC) 10 mg tablet Take  by mouth as needed. 5/29/10  Yes Codey Nina MD  
MULTIVITAMINS (MULTI-VITAMIN PO) Take  by mouth daily. 5/29/10  Yes Codey Nina MD  
  
 
Review of Systems Constitutional:  She denies fever, weight loss, sweats or fatigue. EYES: No blurred or double vision, ENT: no nasal congestion, no headache or dizziness. No difficulty with               swallowing, taste, speech or smell. Respiratory:  No cough, wheezing or shortness of breath. No sputum production. Cardiac:  Denies chest pain, palpitations, unexplained indigestion, syncope, edema, PND or orthopnea. GI:  No changes in bowel movements, no bloating, anorexia, nausea, vomiting or heartburn. Possible left lower quadrant abdominal pain as noted :  No frequency or dysuria. Denies incontinence or sexual dysfunction.  
Extremities:  No joint pain, stiffness or swelling Back:.no pain or soreness Skin:  No recent rashes or mole changes. Neurological:  No numbness, tingling, burning paresthesias or loss of motor strength. No syncope, dizziness, frequent headaches or memory loss. Hematologic:  No easy bruising Lymphatic: No lymph node enlargement Objective:  
 
Vitals:  
 07/31/18 1548 BP: 118/80 Pulse: 72 Resp: 20 SpO2: 97% Weight: 263 lb (119.3 kg) Height: 5' 8.5\" (1.74 m) PainSc:   1 PainLoc: Abdomen Body mass index is 39.41 kg/(m^2). Physical Examination:  
           General Appearance:  Well-developed, well-nourished, no acute distress. HEENT:   
  Ears:  The TMs and ear canals were clear. Eyes:  The pupillary responses were normal.  Extraocular muscle function intact. Lids and conjunctiva not injected. Funduscopic exam revealed sharp disc margins. Nares: Clear w/o edema or erythema Pharynx:  Clear with teeth in good repair. No masses were noted. Neck:  Supple without thyromegaly or adenopathy. No JVD noted. No carotid                bruits. Lungs:  Clear to auscultation and percussion. Cardiac:  Regular rate and rhythm without murmur. PMI not displaced. No gallop, rub or click. Abdominal: Soft, no hepata-spleenomegally or masses. Tender left lower abdomen without rebound Extremities:  No clubbing, cyanosis or edema. Skin:  No rash or unusual mole changes noted. Lymph Nodes:  None felt in the cervical, supraclavicular, axillary or inguinal region. Neurological: . DTRs 2+ and symmetric. Station and gait normal.  
Hematologic:   No purpura or petechiae Assessment/Plan: 1. Left lower quadrant pain 2. Colitis Impressions/Plan: 
Impression 1. Recurrent left lower quadrant abdominal pain question colitis at this point and placed on Flagyl 500 mg 3 times daily for 10 days and set her up for GI evaluation as I think she needs to have a colonoscopy. Follow-up with me again in about 2 weeks. Orders Placed This Encounter [address]  metroNIDAZOLE (FLAGYL) 500 mg tablet Follow-up Disposition: 
Return in about 2 weeks (around 8/14/2018). No results found for any visits on 07/31/18. Britni Pryor MD 
 
The patient was given after the visit summary the patient verbalized an understanding of the plans and problems as explained.

## 2018-08-03 ENCOUNTER — TELEPHONE (OUTPATIENT)
Dept: INTERNAL MEDICINE CLINIC | Age: 48
End: 2018-08-03

## 2018-08-04 ENCOUNTER — HOSPITAL ENCOUNTER (EMERGENCY)
Age: 48
Discharge: HOME | End: 2018-08-04
Attending: EMERGENCY MEDICINE
Payer: COMMERCIAL

## 2018-08-04 VITALS
HEIGHT: 68 IN | SYSTOLIC BLOOD PRESSURE: 122 MMHG | TEMPERATURE: 98.6 F | RESPIRATION RATE: 18 BRPM | OXYGEN SATURATION: 99 % | DIASTOLIC BLOOD PRESSURE: 68 MMHG | WEIGHT: 256.62 LBS | HEART RATE: 86 BPM | BODY MASS INDEX: 38.89 KG/M2

## 2018-08-04 DIAGNOSIS — K52.9 COLITIS: Primary | ICD-10-CM

## 2018-08-04 DIAGNOSIS — R11.0 NAUSEA: ICD-10-CM

## 2018-08-04 LAB
ALBUMIN SERPL-MCNC: 3.3 G/DL (ref 3.5–5)
ALBUMIN/GLOB SERPL: 0.8 {RATIO} (ref 1.1–2.2)
ALP SERPL-CCNC: 72 U/L (ref 45–117)
ALT SERPL-CCNC: 19 U/L (ref 12–78)
ANION GAP SERPL CALC-SCNC: 7 MMOL/L (ref 5–15)
APPEARANCE UR: ABNORMAL
AST SERPL-CCNC: 16 U/L (ref 15–37)
BACTERIA URNS QL MICRO: ABNORMAL /HPF
BASOPHILS # BLD: 0 K/UL (ref 0–0.1)
BASOPHILS NFR BLD: 0 % (ref 0–1)
BILIRUB SERPL-MCNC: 0.4 MG/DL (ref 0.2–1)
BILIRUB UR QL: NEGATIVE
BUN SERPL-MCNC: 10 MG/DL (ref 6–20)
BUN/CREAT SERPL: 12 (ref 12–20)
CALCIUM SERPL-MCNC: 8.3 MG/DL (ref 8.5–10.1)
CHLORIDE SERPL-SCNC: 106 MMOL/L (ref 97–108)
CO2 SERPL-SCNC: 25 MMOL/L (ref 21–32)
COLOR UR: ABNORMAL
CREAT SERPL-MCNC: 0.86 MG/DL (ref 0.55–1.02)
DIFFERENTIAL METHOD BLD: ABNORMAL
EOSINOPHIL # BLD: 0.2 K/UL (ref 0–0.4)
EOSINOPHIL NFR BLD: 2 % (ref 0–7)
EPITH CASTS URNS QL MICRO: ABNORMAL /LPF
ERYTHROCYTE [DISTWIDTH] IN BLOOD BY AUTOMATED COUNT: 13.2 % (ref 11.5–14.5)
GLOBULIN SER CALC-MCNC: 4 G/DL (ref 2–4)
GLUCOSE SERPL-MCNC: 91 MG/DL (ref 65–100)
GLUCOSE UR STRIP.AUTO-MCNC: NEGATIVE MG/DL
HCG SERPL QL: NEGATIVE
HCT VFR BLD AUTO: 41.9 % (ref 35–47)
HGB BLD-MCNC: 13.6 G/DL (ref 11.5–16)
HGB UR QL STRIP: NEGATIVE
IMM GRANULOCYTES # BLD: 0.1 K/UL (ref 0–0.04)
IMM GRANULOCYTES NFR BLD AUTO: 1 % (ref 0–0.5)
KETONES UR QL STRIP.AUTO: NEGATIVE MG/DL
LACTATE SERPL-SCNC: 1.2 MMOL/L (ref 0.4–2)
LEUKOCYTE ESTERASE UR QL STRIP.AUTO: ABNORMAL
LIPASE SERPL-CCNC: 112 U/L (ref 73–393)
LYMPHOCYTES # BLD: 1.6 K/UL (ref 0.8–3.5)
LYMPHOCYTES NFR BLD: 15 % (ref 12–49)
MAGNESIUM SERPL-MCNC: 2 MG/DL (ref 1.6–2.4)
MCH RBC QN AUTO: 29.6 PG (ref 26–34)
MCHC RBC AUTO-ENTMCNC: 32.5 G/DL (ref 30–36.5)
MCV RBC AUTO: 91.3 FL (ref 80–99)
MONOCYTES # BLD: 0.9 K/UL (ref 0–1)
MONOCYTES NFR BLD: 8 % (ref 5–13)
NEUTS SEG # BLD: 7.7 K/UL (ref 1.8–8)
NEUTS SEG NFR BLD: 74 % (ref 32–75)
NITRITE UR QL STRIP.AUTO: NEGATIVE
NRBC # BLD: 0 K/UL (ref 0–0.01)
NRBC BLD-RTO: 0 PER 100 WBC
PH UR STRIP: 6 [PH] (ref 5–8)
PLATELET # BLD AUTO: 334 K/UL (ref 150–400)
PMV BLD AUTO: 9.5 FL (ref 8.9–12.9)
POTASSIUM SERPL-SCNC: 3.6 MMOL/L (ref 3.5–5.1)
PROT SERPL-MCNC: 7.3 G/DL (ref 6.4–8.2)
PROT UR STRIP-MCNC: NEGATIVE MG/DL
RBC # BLD AUTO: 4.59 M/UL (ref 3.8–5.2)
RBC #/AREA URNS HPF: ABNORMAL /HPF (ref 0–5)
SODIUM SERPL-SCNC: 138 MMOL/L (ref 136–145)
SP GR UR REFRACTOMETRY: 1.02 (ref 1–1.03)
UROBILINOGEN UR QL STRIP.AUTO: 0.2 EU/DL (ref 0.2–1)
WBC # BLD AUTO: 10.5 K/UL (ref 3.6–11)
WBC URNS QL MICRO: ABNORMAL /HPF (ref 0–4)

## 2018-08-04 PROCEDURE — 87045 FECES CULTURE AEROBIC BACT: CPT | Performed by: EMERGENCY MEDICINE

## 2018-08-04 PROCEDURE — 87493 C DIFF AMPLIFIED PROBE: CPT | Performed by: EMERGENCY MEDICINE

## 2018-08-04 PROCEDURE — 36415 COLL VENOUS BLD VENIPUNCTURE: CPT | Performed by: EMERGENCY MEDICINE

## 2018-08-04 PROCEDURE — 96374 THER/PROPH/DIAG INJ IV PUSH: CPT

## 2018-08-04 PROCEDURE — 99284 EMERGENCY DEPT VISIT MOD MDM: CPT

## 2018-08-04 PROCEDURE — 80053 COMPREHEN METABOLIC PANEL: CPT | Performed by: EMERGENCY MEDICINE

## 2018-08-04 PROCEDURE — 83690 ASSAY OF LIPASE: CPT | Performed by: EMERGENCY MEDICINE

## 2018-08-04 PROCEDURE — 83735 ASSAY OF MAGNESIUM: CPT | Performed by: EMERGENCY MEDICINE

## 2018-08-04 PROCEDURE — 85025 COMPLETE CBC W/AUTO DIFF WBC: CPT | Performed by: EMERGENCY MEDICINE

## 2018-08-04 PROCEDURE — 81001 URINALYSIS AUTO W/SCOPE: CPT | Performed by: EMERGENCY MEDICINE

## 2018-08-04 PROCEDURE — 83605 ASSAY OF LACTIC ACID: CPT | Performed by: EMERGENCY MEDICINE

## 2018-08-04 PROCEDURE — 74011250636 HC RX REV CODE- 250/636: Performed by: EMERGENCY MEDICINE

## 2018-08-04 PROCEDURE — 84703 CHORIONIC GONADOTROPIN ASSAY: CPT | Performed by: EMERGENCY MEDICINE

## 2018-08-04 PROCEDURE — 89055 LEUKOCYTE ASSESSMENT FECAL: CPT | Performed by: EMERGENCY MEDICINE

## 2018-08-04 PROCEDURE — 96361 HYDRATE IV INFUSION ADD-ON: CPT

## 2018-08-04 RX ORDER — ONDANSETRON 2 MG/ML
4 INJECTION INTRAMUSCULAR; INTRAVENOUS
Status: COMPLETED | OUTPATIENT
Start: 2018-08-04 | End: 2018-08-04

## 2018-08-04 RX ORDER — ONDANSETRON 4 MG/1
4 TABLET, ORALLY DISINTEGRATING ORAL
Qty: 20 TAB | Refills: 0 | Status: SHIPPED | OUTPATIENT
Start: 2018-08-04 | End: 2018-08-28 | Stop reason: ALTCHOICE

## 2018-08-04 RX ADMIN — ONDANSETRON 4 MG: 2 INJECTION, SOLUTION INTRAMUSCULAR; INTRAVENOUS at 13:15

## 2018-08-04 RX ADMIN — SODIUM CHLORIDE 1000 ML: 900 INJECTION, SOLUTION INTRAVENOUS at 13:15

## 2018-08-04 NOTE — DISCHARGE INSTRUCTIONS
Colitis: Care Instructions  Your Care Instructions  Colitis is the medical term for swelling (inflammation) of the intestine. It can be caused by different things, such as an infection or loss of blood flow in the intestine. Other causes are problems like Crohn's disease or ulcerative colitis. Symptoms may include fever, diarrhea that may be bloody, or belly pain. Sometimes symptoms go away without treatment. But you may need treatment or more tests, such as blood tests or a stool test. Or you may need imaging tests like a CT scan or a colonoscopy. In some cases, the doctor may want to test a sample of tissue from the intestine. This test is called a biopsy. The doctor has checked you carefully, but problems can develop later. If you notice any problems or new symptoms, get medical treatment right away. Follow-up care is a key part of your treatment and safety. Be sure to make and go to all appointments, and call your doctor if you are having problems. It's also a good idea to know your test results and keep a list of the medicines you take. How can you care for yourself at home? · Rest until you feel better. · Your doctor may recommend that you eat bland foods. These include rice, dry toast or crackers, bananas, and applesauce. · To prevent dehydration, drink plenty of fluids. Choose water and other caffeine-free clear liquids until you feel better. If you have kidney, heart, or liver disease and have to limit fluids, talk with your doctor before you increase the amount of fluids you drink. · Be safe with medicines. Take your medicines exactly as prescribed. Call your doctor if you think you are having a problem with your medicine. You will get more details on the specific medicines your doctor prescribes. When should you call for help? Call 911 anytime you think you may need emergency care. For example, call if:    · You passed out (lost consciousness).     · Your stools are maroon or very bloody.  Call your doctor now or seek immediate medical care if:    · You have new or worse belly pain.     · You have a fever.     · You are vomiting.     · You cannot pass stools or gas.     · You have new or more blood in your stools.    Watch closely for changes in your health, and be sure to contact your doctor if:    · You have new or worse symptoms.     · You are losing weight.     · You do not get better as expected. Where can you learn more? Go to http://ravindra-bobby.info/. Antonio Mackenzie in the search box to learn more about \"Colitis: Care Instructions. \"  Current as of: May 12, 2017  Content Version: 11.7  © 8116-5501 [employer]. Care instructions adapted under license by [employer] (which disclaims liability or warranty for this information). If you have questions about a medical condition or this instruction, always ask your healthcare professional. [place] [address] any warranty or liability for your use of this information. Nausea and Vomiting: Care Instructions  Your Care Instructions    When you are nauseated, you may feel weak and sweaty and notice a lot of saliva in your mouth. Nausea often leads to vomiting. Most of the time you do not need to worry about nausea and vomiting, but they can be signs of other illnesses. Two common causes of nausea and vomiting are stomach flu and food poisoning. Nausea and vomiting from viral stomach flu will usually start to improve within 24 hours. Nausea and vomiting from food poisoning may last from 12 to 48 hours. The doctor has checked you carefully, but problems can develop later. If you notice any problems or new symptoms, get medical treatment right away. Follow-up care is a key part of your treatment and safety. Be sure to make and go to all appointments, and call your doctor if you are having problems.  It's also a good idea to know your test results and keep a list of the medicines you take.  How can you care for yourself at home? · To prevent dehydration, drink plenty of fluids, enough so that your urine is light yellow or clear like water. Choose water and other caffeine-free clear liquids until you feel better. If you have kidney, heart, or liver disease and have to limit fluids, talk with your doctor before you increase the amount of fluids you drink. · Rest in bed until you feel better. · When you are able to eat, try clear soups, mild foods, and liquids until all symptoms are gone for 12 to 48 hours. Other good choices include dry toast, crackers, cooked cereal, and gelatin dessert, such as Jell-O. When should you call for help? Call 911 anytime you think you may need emergency care. For example, call if:    · You passed out (lost consciousness).    Call your doctor now or seek immediate medical care if:    · You have symptoms of dehydration, such as:  ¨ Dry eyes and a dry mouth. ¨ Passing only a little dark urine. ¨ Feeling thirstier than usual.     · You have new or worsening belly pain.     · You have a new or higher fever.     · You vomit blood or what looks like coffee grounds.    Watch closely for changes in your health, and be sure to contact your doctor if:    · You have ongoing nausea and vomiting.     · Your vomiting is getting worse.     · Your vomiting lasts longer than 2 days.     · You are not getting better as expected. Where can you learn more? Go to http://ravindra-bobby.info/. Enter 25 [id number] in the search box to learn more about \"Nausea and Vomiting: Care Instructions. \"  Current as of: November 20, 2017  Content Version: 11.7  © 4206-1890 [employer]. Care instructions adapted under license by [employer] (which disclaims liability or warranty for this information).  If you have questions about a medical condition or this instruction, always ask your healthcare professional. Adela Crouch disclaims any warranty or liability for your use of this information. Thank you! Thank you for allowing us to provide you with excellent care today. We hope we addressed all of your concerns and needs. We strive to provide excellent quality care in the Emergency Department. You may receive a survey after your visit to evaluate the care you were provided. Should you receive a survey from us, we invite you to share your experience and tell us what made it excellent. It was a pleasure serving you, we invite you to share your experience with us, in our pursuit for excellence, should you be selected to receive a survey. If you feel that you have not received excellent quality care or timely care, please ask to speak to the nurse manager. Please choose us in the future for your continued health care needs. ------------------------------------------------------------------------------------------------------------  The exam and treatment you received in the Emergency Department were for an urgent problem and are not intended as complete care. It is important that you follow up with a doctor, nurse practitioner, or physician assistant for ongoing care. If your symptoms become worse or you do not improve as expected and you are unable to reach your usual health care provider, you should return to the Emergency Department. We are available 24 hours a day. Please take your discharge instructions with you when you go to your follow-up appointment. If you have any problem arranging a follow-up appointment, contact the Emergency Department immediately. If a prescription has been provided, please have it filled as soon as possible to prevent a delay in treatment. Read the entire medication instruction sheet provided to you by the pharmacy.  If you have any questions or reservations about taking the medication due to side effects or interactions with other medications, please call your primary care physician or contact the ER to speak with the charge nurse. Make an appointment with your family doctor or the physician you were referred to for follow-up of this visit as instructed on your discharge paperwork, as this is mandatory follow-up. Return to the ER if you are unable to be seen or if you are unable to be seen in a timely manner. If you have any problem arranging the follow-up visit, contact the Emergency Department immediately.

## 2018-08-04 NOTE — ED PROVIDER NOTES
EMERGENCY DEPARTMENT HISTORY AND PHYSICAL EXAM 
 
 
Date: 8/4/2018 Patient Name: Lavern Gillespie History of Presenting Illness Chief Complaint Patient presents with  Diarrhea  
  history of diverticulosis was treated with Cipro, now is having a lot of diarrhea.  started taking flagyl on Wednesday and now with diarrhea History Provided By: Patient HPI: Lavern Gillespie, 49 y.o. female with PMHx significant for GERD and asthma, presents ambulatory to the ED with cc of a constant LLQ pain that has been persistent for ~1 month. Pt notes she was seen by Dr. Addy Gao, who performed a pelvic US and an abdominal CT. Dr. Addy Gao diagnosed the pt with diverticulosis and started her on Cipro. She states that her treatment worked until ~5 days ago when she began feeling LLQ pressure and cramping. Pt notes her menstrual cycle began earlier than usual. She reports seeing Dr. Addy Gao again, who then prescribed Flagyl. She conveys her abdominal pain returned 4 days ago, but stopped 3 days ago with antibiotics. The pt states she had frequent episodes of diarrhea at work yesterday and then a few episodes this morning. The pt notes she was advised by Dr. Laquita Mustafa to take probiotics. Pt adds she had some yogurt yesterday. Pt conveys she reduced her Flagyl dosage from TID to BID. The pt was then referred by Dr. Laquita Mustafa to [facility] ED for blood work. She specifically denies any fever, chills, vomiting, light-headedness, CP, SOB and HA. Social Hx: -Tobacco, -EtOH Family Hx: HTN, DM, CAD Chief Complaint: Abdominal pain Duration: 1 Month Timing:  Constant Location: LLQ Quality: Aching Modifying Factors: Briefly alleviated with antibiotics There are no other complaints, changes, or physical findings at this time. PCP: Himanshu Bejarano MD 
 
Current Outpatient Prescriptions Medication Sig Dispense Refill  ondansetron (ZOFRAN ODT) 4 mg disintegrating tablet Take 1 Tab by mouth every eight (8) hours as needed for Nausea. 20 Tab 0  
 metroNIDAZOLE (FLAGYL) 500 mg tablet Take 1 Tab by mouth three (3) times daily. 30 Tab 0  
 OTHER 5 Tabs by SubLINGual route daily. Take 5 tablets under tongue for 7 days  calcium carbonate (MYLANTA) 400 mg chew Take 1 Tab by mouth three (3) times daily (with meals).  clobetasol (TEMOVATE) 0.05 % topical cream APPLY TOPICALLY BID PRN 60 g 5  
 albuterol (PROVENTIL HFA, VENTOLIN HFA, PROAIR HFA) 90 mcg/actuation inhaler Take 2 Puffs by inhalation every six (6) hours as needed for Wheezing. 1 Inhaler 1  
 cetirizine (ZYRTEC) 10 mg tablet Take  by mouth as needed.  MULTIVITAMINS (MULTI-VITAMIN PO) Take  by mouth daily. Past History Past Medical History: 
Past Medical History:  
Diagnosis Date  Asthma  Edema of extremities 7/27/2010  Incontinence 7/27/2010  Indigestion 7/27/2010  Joint pain 7/27/2010  Morbid obesity ([address] 7/27/2010  Reflux 7/27/2010 Past Surgical History: 
Past Surgical History:  
Procedure Laterality Date [address] [place] UNLISTED Lap band '08, hernia  HX ORTHOPAEDIC    
 trigger finger  LAP, PLACE ADJUST GASTR BAND  12/4/08 Family History: 
Family History Problem Relation Age of Onset  Hypertension Mother  Other Mother 76  
  aneurysm in her head  High Cholesterol Father  Heart Disease Father Social History: 
Social History Substance Use Topics  Smoking status: Never Smoker  Smokeless tobacco: Never Used  Alcohol use Yes Comment: socially Allergies: 
No Known Allergies Review of Systems Review of Systems Constitutional: Negative for chills and fever. HENT: Negative for congestion. Eyes: Negative for visual disturbance. Respiratory: Negative for shortness of breath. Cardiovascular: Negative for chest pain. Gastrointestinal: Positive for abdominal pain, diarrhea and nausea. Negative for vomiting.   
Endocrine: Negative for heat intolerance. Genitourinary: Negative. Musculoskeletal: Negative for back pain. Skin: Negative for rash. Allergic/Immunologic: Negative for immunocompromised state. Neurological: Negative for light-headedness and headaches. Hematological: Does not bruise/bleed easily. Psychiatric/Behavioral: Negative. All other systems reviewed and are negative. Physical Exam  
Physical Exam  
Constitutional: She is oriented to person, place, and time. She appears well-developed. No distress. Elevated BMI. HENT:  
Head: Normocephalic and atraumatic. Eyes: EOM are normal. Pupils are equal, round, and reactive to light. Neck: Normal range of motion. Neck supple. Cardiovascular: Normal rate, regular rhythm and normal heart sounds. Pulmonary/Chest: Effort normal and breath sounds normal. No respiratory distress. Abdominal: Soft. Bowel sounds are normal. She exhibits no mass. There is tenderness in the left lower quadrant. Musculoskeletal: Normal range of motion. She exhibits no edema. Neurological: She is alert and oriented to person, place, and time. Coordination normal.  
Skin: Skin is warm and dry. Psychiatric: She has a normal mood and affect. Her behavior is normal.  
Nursing note and vitals reviewed. Diagnostic Study Results Labs - Recent Results (from the past 12 hour(s)) CBC WITH AUTOMATED DIFF Collection Time: 08/04/18  1:01 PM  
Result Value Ref Range WBC 10.5 3.6 - 11.0 K/uL  
 RBC 4.59 3.80 - 5.20 M/uL  
 HGB 13.6 11.5 - 16.0 g/dL HCT 41.9 35.0 - 47.0 % MCV 91.3 80.0 - 99.0 FL  
 MCH 29.6 26.0 - 34.0 PG  
 MCHC 32.5 30.0 - 36.5 g/dL  
 RDW 13.2 11.5 - 14.5 % PLATELET 853 168 - 866 K/uL MPV 9.5 8.9 - 12.9 FL  
 NRBC 0.0 0  WBC ABSOLUTE NRBC 0.00 0.00 - 0.01 K/uL NEUTROPHILS 74 32 - 75 % LYMPHOCYTES 15 12 - 49 % MONOCYTES 8 5 - 13 % EOSINOPHILS 2 0 - 7 % BASOPHILS 0 0 - 1 %  IMMATURE GRANULOCYTES 1 (H) 0.0 - 0.5 % ABS. NEUTROPHILS 7.7 1.8 - 8.0 K/UL  
 ABS. LYMPHOCYTES 1.6 0.8 - 3.5 K/UL  
 ABS. MONOCYTES 0.9 0.0 - 1.0 K/UL  
 ABS. EOSINOPHILS 0.2 0.0 - 0.4 K/UL  
 ABS. BASOPHILS 0.0 0.0 - 0.1 K/UL  
 ABS. IMM. GRANS. 0.1 (H) 0.00 - 0.04 K/UL  
 DF AUTOMATED METABOLIC PANEL, COMPREHENSIVE Collection Time: 08/04/18  1:01 PM  
Result Value Ref Range Sodium 138 136 - 145 mmol/L Potassium 3.6 3.5 - 5.1 mmol/L Chloride 106 97 - 108 mmol/L  
 CO2 25 21 - 32 mmol/L Anion gap 7 5 - 15 mmol/L Glucose 91 65 - 100 mg/dL BUN 10 6 - 20 MG/DL Creatinine 0.86 0.55 - 1.02 MG/DL  
 BUN/Creatinine ratio 12 12 - 20 GFR est AA >60 >60 ml/min/1.73m2 GFR est non-AA >60 >60 ml/min/1.73m2 Calcium 8.3 (L) 8.5 - 10.1 MG/DL Bilirubin, total 0.4 0.2 - 1.0 MG/DL  
 ALT (SGPT) 19 12 - 78 U/L  
 AST (SGOT) 16 15 - 37 U/L Alk. phosphatase 72 45 - 117 U/L Protein, total 7.3 6.4 - 8.2 g/dL Albumin 3.3 (L) 3.5 - 5.0 g/dL Globulin 4.0 2.0 - 4.0 g/dL A-G Ratio 0.8 (L) 1.1 - 2.2 MAGNESIUM Collection Time: 08/04/18  1:01 PM  
Result Value Ref Range Magnesium 2.0 1.6 - 2.4 mg/dL LIPASE Collection Time: 08/04/18  1:01 PM  
Result Value Ref Range Lipase 112 73 - 393 U/L  
URINALYSIS W/ RFLX MICROSCOPIC Collection Time: 08/04/18  1:01 PM  
Result Value Ref Range Color YELLOW/STRAW Appearance CLOUDY (A) CLEAR Specific gravity 1.020 1.003 - 1.030    
 pH (UA) 6.0 5.0 - 8.0 Protein NEGATIVE  NEG mg/dL Glucose NEGATIVE  NEG mg/dL Ketone NEGATIVE  NEG mg/dL Bilirubin NEGATIVE  NEG Blood NEGATIVE  NEG Urobilinogen 0.2 0.2 - 1.0 EU/dL Nitrites NEGATIVE  NEG Leukocyte Esterase SMALL (A) NEG    
 WBC 0-4 0 - 4 /hpf  
 RBC 0-5 0 - 5 /hpf Epithelial cells MODERATE (A) FEW /lpf Bacteria 1+ (A) NEG /hpf  
HCG QL SERUM Collection Time: 08/04/18  1:01 PM  
Result Value Ref Range HCG, Ql. NEGATIVE  NEG    
LACTIC ACID  Collection Time: 08/04/18  2:01 PM  
Result Value Ref Range Lactic acid 1.2 0.4 - 2.0 MMOL/L Medical Decision Making I am the first provider for this patient. I reviewed the vital signs, available nursing notes, past medical history, past surgical history, family history and social history. Vital Signs-Reviewed the patient's vital signs. Patient Vitals for the past 12 hrs: 
 Temp Pulse Resp BP SpO2  
08/04/18 1630 - - - 122/68 99 % 08/04/18 1600 - - - 119/73 98 % 08/04/18 1530 - - - 103/54 97 % 08/04/18 1500 - - - 144/77 100 % 08/04/18 1430 - - - 132/78 99 % 08/04/18 1330 - - - 121/60 98 % 08/04/18 1228 98.6 °F (37 °C) 86 18 134/74 99 % Pulse Oximetry Analysis - 99% on RA Records Reviewed: Nursing Notes and Old Medical Records Provider Notes (Medical Decision Making): DDx: Colitis, gastroenteritis, dehydration ED Course:  
Initial assessment performed. The patients presenting problems have been discussed, and they are in agreement with the care plan formulated and outlined with them. I have encouraged them to ask questions as they arise throughout their visit. 1:40 PM 
Dr. Dwayne Marks asked pt for a CT, but pt insists on waiting for her stool sample instead. Pt notes her nausea has improved. Disposition: 
Discharge Note: 
4:50 PM 
The pt is ready for discharge. The pt's signs, symptoms, diagnosis, and discharge instructions have been discussed and pt has conveyed their understanding. The pt is to follow up as recommended or return to ER should their symptoms worsen. Plan has been discussed and pt is in agreement. PLAN: 
1. Discharge Discharge Medication List as of 8/4/2018  4:32 PM  
  
START taking these medications Details  
ondansetron (ZOFRAN ODT) 4 mg disintegrating tablet Take 1 Tab by mouth every eight (8) hours as needed for Nausea., Normal, Disp-20 Tab, R-0  
  
  
CONTINUE these medications which have NOT CHANGED  Details  
metroNIDAZOLE (FLAGYL) 500 mg tablet Take 1 Tab by mouth three (3) times daily. , Normal, Disp-30 Tab, R-0  
  
OTHER 5 Tabs by SubLINGual route daily. Take 5 tablets under tongue for 7 days, Historical Med  
  
calcium carbonate (MYLANTA) 400 mg chew Take 1 Tab by mouth three (3) times daily (with meals). , Historical Med  
  
clobetasol (TEMOVATE) 0.05 % topical cream APPLY TOPICALLY BID PRN, Normal, Disp-60 g, R-5  
  
albuterol (PROVENTIL HFA, VENTOLIN HFA, PROAIR HFA) 90 mcg/actuation inhaler Take 2 Puffs by inhalation every six (6) hours as needed for Wheezing., Print, Disp-1 Inhaler, R-1  
  
cetirizine (ZYRTEC) 10 mg tablet Oral, AS NEEDED Starting 5/29/2010, Until Discontinued, Historical Med MULTIVITAMINS (MULTI-VITAMIN PO) Oral, DAILY Starting 5/29/2010, Until Discontinued, Historical Med 2. Follow-up Information Follow up With Details Comments Contact Info Edi Brito MD In 2 days  [address] 
[phone] [facility] EMERGENCY DEPT   [address] [place] [address] 
[phone] Return to ED if worse Diagnosis Clinical Impression: 1. Colitis 2. Nausea Attestations: This note is prepared by Dino Apodaca, acting as Scribe for MD Nicol Harper MD: The scribe's documentation has been prepared under my direction and personally reviewed by me in its entirety. I confirm that the note above accurately reflects all work, treatment, procedures, and medical decision making performed by me.

## 2018-08-04 NOTE — ED NOTES
Complaint of LLQ pain > 1 month, investigated by Dr Bruce Redman with pelvic ultrasound and abdominal CT  Revealing diverticulosis. This was treated with Cipro and Flagyl. She started with watery yellow diarrhea over the last day or so. Complaint of nausea last night but no vomiting.

## 2018-08-05 LAB
C DIFF TOX GENS STL QL NAA+PROBE: NEGATIVE
WBC #/AREA STL HPF: NORMAL /HPF (ref 0–4)

## 2018-08-06 ENCOUNTER — TELEPHONE (OUTPATIENT)
Dept: INTERNAL MEDICINE CLINIC | Age: 48
End: 2018-08-06

## 2018-08-06 NOTE — TELEPHONE ENCOUNTER
Received a call from patient stating she had severe diarrhea over the weekend. She talked with Dr. Javier De La Rosa, who instructed her to stop the Flagyl and go to the ER. She states the ER gave her IV fluids and collected a stool specimen. She is now having soft stools, but wants to know if she needs to be on another antibiotic. Dr. Sabina Burnham notified. Per Dr. Sabina Burnham, instruct patient to take OTC Probiotic daily. Patient informed. She states she started the Probiotic on Friday and will see if she can get in to see the GI doctor sooner.

## 2018-08-07 LAB
BACTERIA SPEC CULT: NORMAL
C JEJUNI+C COLI AG STL QL: NEGATIVE
E COLI SXT1+2 STL IA: NEGATIVE
SERVICE CMNT-IMP: NORMAL

## 2018-08-08 NOTE — PROGRESS NOTES
chip        Past Medical History:   Diagnosis Date    Asthma     Edema of extremities 7/27/2010    Incontinence 7/27/2010    Indigestion 7/27/2010    Joint pain 7/27/2010    Morbid obesity ([address] 7/27/2010    Reflux 7/27/2010     Past Surgical History:   Procedure Laterality Date    ABDOMEN SURGERY PROC UNLISTED      Lap band '08, hernia    HX ORTHOPAEDIC      trigger finger    LAP, PLACE ADJUST GASTR BAND  12/4/08       Current Outpatient Prescriptions on File Prior to Visit   Medication Sig Dispense Refill    clobetasol (TEMOVATE) 0.05 % topical cream APPLY TOPICALLY BID PRN 60 g 5    cetirizine (ZYRTEC) 10 mg tablet Take  by mouth as needed.  MULTIVITAMINS (MULTI-VITAMIN PO) Take  by mouth daily.  albuterol (PROVENTIL HFA, VENTOLIN HFA, PROAIR HFA) 90 mcg/actuation inhaler Take 2 Puffs by inhalation every six (6) hours as needed for Wheezing. 1 Inhaler 1     No current facility-administered medications on file prior to visit.       No Known Allergies

## 2018-08-28 ENCOUNTER — OFFICE VISIT (OUTPATIENT)
Dept: INTERNAL MEDICINE CLINIC | Age: 48
End: 2018-08-28

## 2018-08-28 VITALS
WEIGHT: 265.4 LBS | DIASTOLIC BLOOD PRESSURE: 88 MMHG | BODY MASS INDEX: 40.22 KG/M2 | SYSTOLIC BLOOD PRESSURE: 120 MMHG | RESPIRATION RATE: 16 BRPM | HEIGHT: 68 IN | OXYGEN SATURATION: 98 % | HEART RATE: 72 BPM

## 2018-08-28 DIAGNOSIS — E66.01 MORBID OBESITY (HCC): ICD-10-CM

## 2018-08-28 DIAGNOSIS — K52.9 COLITIS: Primary | ICD-10-CM

## 2018-08-28 DIAGNOSIS — R10.32 LEFT LOWER QUADRANT PAIN: ICD-10-CM

## 2018-08-28 NOTE — PROGRESS NOTES
Chief Complaint Patient presents with  Abdominal Pain  
  1 month follow up 1. Have you been to the ER, urgent care clinic since your last visit? Hospitalized since your last visit? No 
 
2. Have you seen or consulted any other health care providers outside of the [facility] since your last visit? Include any pap smears or colon screening.  No

## 2018-08-28 NOTE — MR AVS SNAPSHOT
[address] [facility] 
 
 
 [address] [address] [phone] Patient: Jess Haque MRN: [id number] :1970 Visit Information Date & Time Provider Department Dept. Phone Encounter #  
 2018  2:30 PM Ivy Siemens, [address] [facility] [phone] [id number] Your Appointments 5/3/2019  8:00 AM  
FOLLOW UP 10 with Ivy Siemens, MD [facility] (Victoria Silvia) Appt Note: follow up yearly; follow up yearly [address] [address] [address] [facility] [address] [address] Upcoming Health Maintenance Date Due DTaP/Tdap/Td series (1 - Tdap) 1990 PAP AKA CERVICAL CYTOLOGY 1990 Influenza Age 5 to Adult 2018 BREAST CANCER SCRN MAMMOGRAM 2019 Allergies as of 2018  Review Complete On: 2018 By: Kain Spain CMA No Known Allergies Current Immunizations  Reviewed on 2018 Name Date Pneumococcal Polysaccharide (PPSV-23) 2018 Not reviewed this visit Vitals BP Pulse Resp Height(growth percentile) Weight(growth percentile) SpO2  
 120/88 (BP 1 Location: Left arm, BP Patient Position: Sitting) 72 16 5' 8\" (1.727 m) 265 lb 6.4 oz (120.4 kg) 98% BMI OB Status Smoking Status 40.35 kg/m2 Premenopausal Never Smoker BMI and BSA Data Body Mass Index Body Surface Area  
 40.35 kg/m 2 2.4 m 2 Preferred Pharmacy Pharmacy Name Phone [address] [facility] [phone] - [address], [address] AT [facility] [phone] Your Updated Medication List  
  
   
This list is accurate as of 18  3:40 PM.  Always use your most recent med list.  
  
  
  
  
 albuterol 90 mcg/actuation inhaler Commonly known as:  PROVENTIL HFA, VENTOLIN HFA, PROAIR HFA  
 Take 2 Puffs by inhalation every six (6) hours as needed for Wheezing. calcium carbonate 400 mg Chew Commonly known as:  MYLANTA Take 1 Tab by mouth three (3) times daily (with meals). clobetasol 0.05 % topical cream  
Commonly known as:  TEMOVATE  
APPLY TOPICALLY BID PRN  
  
 MULTI-VITAMIN PO Take  by mouth daily. OTHER  
5 Tabs by SubLINGual route daily. Take 5 tablets under tongue for 7 days ZyrTEC 10 mg tablet Generic drug:  cetirizine Take  by mouth as needed. Introducing [facility]! Dieter Vanegas introduces Vantos patient portal. Now you can access parts of your medical record, email your doctor's office, and request medication refills online. 1. In your internet browser, go to [email or web address]. [employer] 2. Click on the First Time User? Click Here link in the Sign In box. You will see the New Member Sign Up page. 3. Enter your Vantos Access Code exactly as it appears below. You will not need to use this code after youve completed the sign-up process. If you do not sign up before the expiration date, you must request a new code. · Vantos Access Code: [email or web address] Expires: 10/10/2018  1:27 PM 
 
4. Enter the last four digits of your Social Security Number (xxxx) and Date of Birth (mm/dd/yyyy) as indicated and click Submit. You will be taken to the next sign-up page. 5. Create a Vantos ID. This will be your Vantos login ID and cannot be changed, so think of one that is secure and easy to remember. 6. Create a Vantos password. You can change your password at any time. 7. Enter your Password Reset Question and Answer. This can be used at a later time if you forget your password. 8. Enter your e-mail address. You will receive e-mail notification when new information is available in [address] 9. Click Sign Up. You can now view and download portions of your medical record. 10. Click the Download Summary menu link to download a portable copy of your medical information. If you have questions, please visit the Frequently Asked Questions section of the hyaqu website. Remember, hyaqu is NOT to be used for urgent needs. For medical emergencies, dial 911. Now available from your iPhone and Android! Please provide this summary of care documentation to your next provider. Your primary care clinician is listed as Emilie. If you have any questions after today's visit, please call [phone].

## 2018-08-28 NOTE — PATIENT INSTRUCTIONS
Colitis: Care Instructions Your Care Instructions Colitis is the medical term for swelling (inflammation) of the intestine. It can be caused by different things, such as an infection or loss of blood flow in the intestine. Other causes are problems like Crohn's disease or ulcerative colitis. Symptoms may include fever, diarrhea that may be bloody, or belly pain. Sometimes symptoms go away without treatment. But you may need treatment or more tests, such as blood tests or a stool test. Or you may need imaging tests like a CT scan or a colonoscopy. In some cases, the doctor may want to test a sample of tissue from the intestine. This test is called a biopsy. The doctor has checked you carefully, but problems can develop later. If you notice any problems or new symptoms, get medical treatment right away. Follow-up care is a key part of your treatment and safety. Be sure to make and go to all appointments, and call your doctor if you are having problems. It's also a good idea to know your test results and keep a list of the medicines you take. How can you care for yourself at home? · Rest until you feel better. · Your doctor may recommend that you eat bland foods. These include rice, dry toast or crackers, bananas, and applesauce. · To prevent dehydration, drink plenty of fluids. Choose water and other caffeine-free clear liquids until you feel better. If you have kidney, heart, or liver disease and have to limit fluids, talk with your doctor before you increase the amount of fluids you drink. · Be safe with medicines. Take your medicines exactly as prescribed. Call your doctor if you think you are having a problem with your medicine. You will get more details on the specific medicines your doctor prescribes. When should you call for help? Call 911 anytime you think you may need emergency care. For example, call if: 
  · You passed out (lost consciousness).  
  · Your stools are maroon or very bloody.  Call your doctor now or seek immediate medical care if: 
  · You have new or worse belly pain.  
  · You have a fever.  
  · You are vomiting.  
  · You cannot pass stools or gas.  
  · You have new or more blood in your stools.  
 Watch closely for changes in your health, and be sure to contact your doctor if: 
  · You have new or worse symptoms.  
  · You are losing weight.  
  · You do not get better as expected. Where can you learn more? Go to http://ravindra-bobby.info/. Mikki Conner in the search box to learn more about \"Colitis: Care Instructions. \" Current as of: May 12, 2017 Content Version: 11.7 © 9074-5423 [employer]. Care instructions adapted under license by [employer] (which disclaims liability or warranty for this information). If you have questions about a medical condition or this instruction, always ask your healthcare professional. [address] any warranty or liability for your use of this information.

## 2018-08-28 NOTE — PROGRESS NOTES
Subjective:  
Enrique Ngo is a 51 y.o. female Chief Complaint Patient presents with  Abdominal Pain  
  1 month follow up History of present illness: She returns in follow-up from her recent bout of colitis and seems to be doing better with minimal residual discomfort the left lower abdomen. She notes no associated nausea vomiting. She is noted no fevers or chills. Since she last saw me she is seen the gastroenterologist Dr. Brittany Wood and is scheduled for colonoscopy to be done September 17. She notes no other significant discomfort. Patient Active Problem List  
Diagnosis Code  Morbid obesity ([address] E66.01  
 Incontinence R32  Joint pain M25.50  Gastroesophageal reflux disease without esophagitis K21.9  Indigestion K30  Edema of extremities R60.0  Primary osteoarthritis involving multiple joints M15.0  Acute laryngitis J04.0  Acute URI J06.9  Acute non-recurrent maxillary sinusitis J01.00  
 Mild intermittent asthma without complication C50.56  Annual physical exam Z00.00  Left lower quadrant pain R10.32  
 Colitis K52.9 Past Medical History:  
Diagnosis Date  Asthma  Edema of extremities 7/27/2010  Incontinence 7/27/2010  Indigestion 7/27/2010  Joint pain 7/27/2010  Morbid obesity ([facility] [address] 7/27/2010  Reflux 7/27/2010 No Known Allergies Family History Problem Relation Age of Onset  Hypertension Mother  Other Mother 76  
  aneurysm in her head  High Cholesterol Father  Heart Disease Father Social History Social History  Marital status:  Spouse name: N/A  
 Number of children: N/A  
 Years of education: N/A Occupational History  Not on file. Social History Main Topics  Smoking status: Never Smoker  Smokeless tobacco: Never Used  Alcohol use Yes Comment: socially  Drug use: No  
 Sexual activity: Yes  
  Partners: Male Birth control/ protection: None Other Topics Concern  Not on file Social History Narrative Prior to Admission medications Medication Sig Start Date End Date Taking? Authorizing Provider OTHER 5 Tabs by SubLINGual route daily. Take 5 tablets under tongue for 7 days   Yes Historical Provider  
calcium carbonate (MYLANTA) 400 mg chew Take 1 Tab by mouth three (3) times daily (with meals). Yes Historical Provider  
clobetasol (TEMOVATE) 0.05 % topical cream APPLY TOPICALLY BID PRN 6/25/18  Yes Rufina Casey MD  
albuterol (PROVENTIL HFA, VENTOLIN HFA, PROAIR HFA) 90 mcg/actuation inhaler Take 2 Puffs by inhalation every six (6) hours as needed for Wheezing. 12/1/17  Yes Rufina Casey MD  
cetirizine (ZYRTEC) 10 mg tablet Take  by mouth as needed. 5/29/10  Yes Codey Nina MD  
MULTIVITAMINS (MULTI-VITAMIN PO) Take  by mouth daily. 5/29/10  Yes Codey Nina MD  
  
 
Review of Systems Constitutional:  She denies fever, weight loss, sweats or fatigue. EYES: No blurred or double vision, ENT: no nasal congestion, no headache or dizziness. No difficulty with               swallowing, taste, speech or smell. Respiratory:  No cough, wheezing or shortness of breath. No sputum production. Cardiac:  Denies chest pain, palpitations, unexplained indigestion, syncope, edema, PND or orthopnea. GI:  No changes in bowel movements, no abdominal pain, no bloating, anorexia, nausea, vomiting or heartburn. :  No frequency or dysuria. Denies incontinence or sexual dysfunction. Extremities:  No joint pain, stiffness or swelling Back:.no pain or soreness Skin:  No recent rashes or mole changes. Neurological:  No numbness, tingling, burning paresthesias or loss of motor strength. No syncope, dizziness, frequent headaches or memory loss. Hematologic:  No easy bruising Lymphatic: No lymph node enlargement Objective:  
 
Vitals:  
 08/28/18 1459 BP: 120/88 Pulse: 72 Resp: 16  
 SpO2: 98% Weight: 265 lb 6.4 oz (120.4 kg) Height: 5' 8\" (1.727 m) PainSc:   0 - No pain Body mass index is 40.35 kg/(m^2). Physical Examination:  
           General Appearance:  Well-developed, well-nourished, no acute distress. HEENT:   
  Ears:  The TMs and ear canals were clear. Eyes:  The pupillary responses were normal.  Extraocular muscle function intact. Lids and conjunctiva not injected. Funduscopic exam revealed sharp disc margins. Nares: Clear w/o edema or erythema Pharynx:  Clear with teeth in good repair. No masses were noted. Neck:  Supple without thyromegaly or adenopathy. No JVD noted. No carotid                bruits. Lungs:  Clear to auscultation and percussion. Cardiac:  Regular rate and rhythm without murmur. PMI not displaced. No gallop, rub or click. Abdominal: Soft, non-tender except minimal discomfort left lower quad, no hepata-spleenomegally or masses Extremities:  No clubbing, cyanosis or edema. Skin:  No rash or unusual mole changes noted. Lymph Nodes:  None felt in the cervical, supraclavicular, axillary or inguinal region. Neurological: . DTRs 2+ and symmetric. Station and gait normal.  
Hematologic:   No purpura or petechiae Assessment/Plan: 1. Colitis 2. Left lower quadrant pain 3. Morbid obesity ([address] Impressions/Plan: 
Impression 1. Colitis I think is clinically seems to be resolving 2. Left lower quadrant pain resolving 3. Morbid obesity we discussed diet, exercise and weight reduction for [facility] health benefit. I will know she is status post lap band but still has had a significant problem with obesity. We discussed exercise. I will recheck her had a prior schedule appointment or sooner should there be a problem. No orders of the defined types were placed in this encounter. Follow-up Disposition: 
Return for At prior schedule point. No results found for any visits on 08/28/18. Jordon Pantoja MD 
 
The patient was given after the visit summary the patient verbalized an understanding of the plans and problems as explained.

## 2019-01-02 ENCOUNTER — OFFICE VISIT (OUTPATIENT)
Dept: PRIMARY CARE CLINIC | Age: 49
End: 2019-01-02

## 2019-01-02 VITALS
WEIGHT: 266.6 LBS | RESPIRATION RATE: 16 BRPM | HEIGHT: 68 IN | BODY MASS INDEX: 40.41 KG/M2 | SYSTOLIC BLOOD PRESSURE: 149 MMHG | TEMPERATURE: 98.5 F | DIASTOLIC BLOOD PRESSURE: 85 MMHG | HEART RATE: 71 BPM | OXYGEN SATURATION: 98 %

## 2019-01-02 DIAGNOSIS — R05.9 COUGH: Primary | ICD-10-CM

## 2019-01-02 DIAGNOSIS — J06.9 UPPER RESPIRATORY TRACT INFECTION, UNSPECIFIED TYPE: ICD-10-CM

## 2019-01-02 RX ORDER — ALBUTEROL SULFATE 90 UG/1
AEROSOL, METERED RESPIRATORY (INHALATION)
Qty: 1 INHALER | Refills: 5 | Status: SHIPPED | OUTPATIENT
Start: 2019-01-02 | End: 2021-03-18

## 2019-01-02 RX ORDER — HYDROCODONE POLISTIREX AND CHLORPHENIRAMINE POLISTIREX 10; 8 MG/5ML; MG/5ML
1 SUSPENSION, EXTENDED RELEASE ORAL
Qty: 100 ML | Refills: 0 | Status: SHIPPED | OUTPATIENT
Start: 2019-01-02 | End: 2019-01-12

## 2019-01-02 RX ORDER — BENZONATATE 200 MG/1
200 CAPSULE ORAL
Qty: 21 CAP | Refills: 0 | Status: SHIPPED | OUTPATIENT
Start: 2019-01-02 | End: 2019-01-09

## 2019-01-02 RX ORDER — AZITHROMYCIN 250 MG/1
250 TABLET, FILM COATED ORAL SEE ADMIN INSTRUCTIONS
Qty: 6 TAB | Refills: 0 | Status: SHIPPED | OUTPATIENT
Start: 2019-01-02 | End: 2019-01-07

## 2019-01-02 RX ORDER — HYDROCODONE POLISTIREX AND CHLORPHENIRAMINE POLISTIREX 10; 8 MG/5ML; MG/5ML
1 SUSPENSION, EXTENDED RELEASE ORAL
Qty: 100 ML | Refills: 0 | Status: SHIPPED | OUTPATIENT
Start: 2019-01-02 | End: 2019-01-02 | Stop reason: DRUGHIGH

## 2019-01-02 NOTE — TELEPHONE ENCOUNTER
RX refill request from the patient/pharmacy. Patient last seen 08- with labs, and next appt. scheduled for 05-  Requested Prescriptions     Pending Prescriptions Disp Refills    PROAIR HFA 90 mcg/actuation inhaler [Pharmacy Med Name: PROAIR HFA ORAL INH (200  PFS) 8.5G] 1 Inhaler 5     Sig: TAKE 2 PUFFS BY MOUTH FOUR TIMES DAILY AS NEEDED   .

## 2019-01-02 NOTE — PATIENT INSTRUCTIONS

## 2019-01-02 NOTE — PROGRESS NOTES
Chief Complaint   Patient presents with    Cough     Cough with laryngitis since New Years Debora, history of bronchitis, noted chest tightness

## 2019-01-02 NOTE — PROGRESS NOTES
Subjective:   Ángel Aleman is a 51 y.o. female who complains of coryza, congestion, sore throat, swollen glands, productive cough, cough described as productive, productive of green sputum, myalgias, fever and chills for 10 days, gradually worsening since that time. She denies a history of shortness of breath and wheezing. Evaluation to date: none. Treatment to date: OTC products. Patient does not smoke cigarettes. Relevant PMH: No pertinent additional PMH. Patient Active Problem List   Diagnosis Code    Morbid obesity ([facility] [address] [facility] E66.01    Incontinence R32    Joint pain M25.50    Gastroesophageal reflux disease without esophagitis K21.9    Indigestion K30    Edema of extremities R60.0    Primary osteoarthritis involving multiple joints M15.0    Acute laryngitis J04.0    Acute URI J06.9    Acute non-recurrent maxillary sinusitis J01.00    Mild intermittent asthma without complication T08.10    Annual physical exam Z00.00    Left lower quadrant pain R10.32    Colitis K52.9     Patient Active Problem List    Diagnosis Date Noted    Left lower quadrant pain 07/13/2018    Colitis 07/13/2018    Annual physical exam 04/06/2018    Mild intermittent asthma without complication 63/61/5068    Acute non-recurrent maxillary sinusitis 03/05/2018    Acute URI 11/30/2017    Primary osteoarthritis involving multiple joints 07/24/2017    Acute laryngitis 07/24/2017    Morbid obesity ([facility] [address] [facility] 07/27/2010    Incontinence 07/27/2010    Joint pain 07/27/2010    Gastroesophageal reflux disease without esophagitis 07/27/2010    Indigestion 07/27/2010    Edema of extremities 07/27/2010     Current Outpatient Medications   Medication Sig Dispense Refill    azithromycin (ZITHROMAX) 250 mg tablet Take 1 Tab by mouth See Admin Instructions for 5 days. 6 Tab 0    benzonatate (TESSALON) 200 mg capsule Take 1 Cap by mouth three (3) times daily as needed for Cough for up to 7 days.  21 Cap 0    chlorpheniramine-HYDROcodone (TUSSIONEX) 10-8 mg/5 mL suspension Take 5 mL by mouth every twelve (12) hours as needed for Cough for up to 10 days. Max Daily Amount: 10 mL. 100 mL 0    clobetasol (TEMOVATE) 0.05 % topical cream APPLY TOPICALLY BID PRN 60 g 5    MULTIVITAMINS (MULTI-VITAMIN PO) Take  by mouth daily.  PROAIR HFA 90 mcg/actuation inhaler TAKE 2 PUFFS BY MOUTH FOUR TIMES DAILY AS NEEDED 1 Inhaler 5     No Known Allergies  Past Medical History:   Diagnosis Date    Asthma     Edema of extremities 7/27/2010    Incontinence 7/27/2010    Indigestion 7/27/2010    Joint pain 7/27/2010    Morbid obesity ([address] 7/27/2010    Reflux 7/27/2010     Past Surgical History:   Procedure Laterality Date    ABDOMEN SURGERY PROC UNLISTED      Lap band '08, hernia    HX ORTHOPAEDIC      trigger finger    LAP, PLACE ADJUST GASTR BAND  12/4/08     Family History   Problem Relation Age of Onset    Hypertension Mother    Mc[place]on Other Mother 76        aneurysm in her head    High Cholesterol Father     Heart Disease Father      Social History     Tobacco Use    Smoking status: Never Smoker    Smokeless tobacco: Never Used   Substance Use Topics    Alcohol use: Yes     Comment: socially        Review of Systems  Pertinent items are noted in HPI. Objective:     Visit Vitals  /85   Pulse 71   Temp 98.5 °F (36.9 °C) (Oral)   Resp 16   Ht 5' 8\" (1.727 m)   Wt 266 lb 9.6 oz (120.9 kg)   SpO2 98%   BMI 40.54 kg/m²     General:  alert, cooperative, no distress   Eyes: conjunctivae/corneas clear. PERRL, EOM's intact. Fundi benign   Ears: normal TM's and external ear canals AU   Sinuses: Normal paranasal sinuses without tenderness   Mouth:  Lips, mucosa, and tongue normal. Teeth and gums normal   Neck: supple, symmetrical, trachea midline and no adenopathy. Heart: S1 and S2 normal, no murmurs noted. Lungs: rhonchi R apex, R base, L apex, L base   Abdomen: soft, non-tender.  Bowel sounds normal. No masses,  no organomegaly        Assessment/Plan:   viral upper respiratory illness  Suggested symptomatic OTC remedies. RTC prn. Discussed diagnosis and treatment of viral URIs. Visit Vitals  /85   Pulse 71   Temp 98.5 °F (36.9 °C) (Oral)   Resp 16   Ht 5' 8\" (1.727 m)   Wt 266 lb 9.6 oz (120.9 kg)   SpO2 98%   BMI 40.54 kg/m²     Spoke with the patient regarding their blood pressure (BP) reading at today's visit. The patient verbalized understanding of need to maintain BP lower than 140/90. The patient will follow up with their primary care physician regarding management and/or medications that may be needed. Drepssion Screening has been completed. The patient isdenies having a history of depression. The patient is nottaking medication and is being followed by their PCP at this time. This patient does  have a primary care physician. Referral was not given a referral at todays visit. Immunizations: The patient is current on their influenza immunization at this time. The patient does not   want to receive the influenza immunization today. Follow up instructions given at today's visit were verbalized by the patient/parent. The signs of infection are fever > 100.4, increase fatigue, change in mental status, or decrease in urinary output. The patient/parent verbalized understanding of taking medications prescribed during this visit as prescribed. ICD-10-CM ICD-9-CM    1. Cough R05 786.2 XR CHEST PA LAT      azithromycin (ZITHROMAX) 250 mg tablet      benzonatate (TESSALON) 200 mg capsule      chlorpheniramine-HYDROcodone (TUSSIONEX) 10-8 mg/5 mL suspension      DISCONTINUED: chlorpheniramine-HYDROcodone (TUSSIONEX) 10-8 mg/5 mL suspension   2. Upper respiratory tract infection, unspecified type J06.9 465.9    .

## 2019-01-03 ENCOUNTER — TELEPHONE (OUTPATIENT)
Dept: PRIMARY CARE CLINIC | Age: 49
End: 2019-01-03

## 2019-01-03 NOTE — TELEPHONE ENCOUNTER
Patient called in for CXR results. Patient identifiers verified. Patient given results per Marylen Hard, NP and verbalized understanding.

## 2019-01-07 ENCOUNTER — HOSPITAL ENCOUNTER (OUTPATIENT)
Dept: ULTRASOUND IMAGING | Age: 49
Discharge: HOME | End: 2019-01-07
Attending: NURSE PRACTITIONER
Payer: COMMERCIAL

## 2019-01-07 ENCOUNTER — OFFICE VISIT (OUTPATIENT)
Dept: PRIMARY CARE CLINIC | Age: 49
End: 2019-01-07

## 2019-01-07 VITALS
SYSTOLIC BLOOD PRESSURE: 145 MMHG | HEART RATE: 78 BPM | BODY MASS INDEX: 40.32 KG/M2 | HEIGHT: 68 IN | OXYGEN SATURATION: 97 % | TEMPERATURE: 98.4 F | RESPIRATION RATE: 16 BRPM | DIASTOLIC BLOOD PRESSURE: 85 MMHG | WEIGHT: 266 LBS

## 2019-01-07 DIAGNOSIS — M25.561 ACUTE PAIN OF RIGHT KNEE: ICD-10-CM

## 2019-01-07 DIAGNOSIS — M25.561 ACUTE PAIN OF RIGHT KNEE: Primary | ICD-10-CM

## 2019-01-07 PROCEDURE — 93971 EXTREMITY STUDY: CPT

## 2019-01-07 NOTE — PROGRESS NOTES
Chief Complaint Patient presents with  Knee Pain Right knee pain since Saturday, stated pain behind the knee and in front, excrutiating pain when walking on it

## 2019-01-08 ENCOUNTER — TELEPHONE (OUTPATIENT)
Dept: PRIMARY CARE CLINIC | Age: 49
End: 2019-01-08

## 2019-01-08 NOTE — TELEPHONE ENCOUNTER
Pt is calling to receive the results to her x-ray done yesterday. She is requesting a call back today.  Please advise

## 2019-01-08 NOTE — TELEPHONE ENCOUNTER
Pt is calling again to receive results. States she was [facility] the provider that she would be notified of results last night and needs to know results today as she has to let her job know if she is able to report back to work. I advised the patient that the nurse was seeing patients at this time but would call hr back when she had a moment. I also let her know that the nurse awaiting for the result to be sent to her. She reiterated that she needs to be called back today.

## 2019-01-09 NOTE — PROGRESS NOTES
Please call pt to advise results are negative for clots, no treatment is needed, follow up with your PCP

## 2019-01-09 NOTE — PROGRESS NOTES
Patient was informed yesterday of test results after referring with El Isabel, NP no further action needed att this time

## 2019-01-09 NOTE — TELEPHONE ENCOUNTER
Patient called office, advised that xray results and doppler were negative per Tyesha Betancourt NP.  Pt verbalized understanding of all information and had no further questions at this time

## 2019-01-10 NOTE — PROGRESS NOTES
HISTORY OF PRESENT ILLNESS Monalisa Telles is a 51 y.o. female. HPI This 51year old lady presents with about a 24 hour history of acute right knee pain. She states she had to borrow her dads crutches to be able to ambulate. She does report feeling a \"pop\" in the knee prior to the pain, otherwise no known trauma. Pt also complains of calf pain No fever Review of Systems Constitutional: Negative for chills and fever. Musculoskeletal: Positive for joint pain (knee). Neurological: Negative for speech change and focal weakness. Physical Exam  
Constitutional: She appears well-developed and well-nourished. No distress. Musculoskeletal: She exhibits tenderness. She exhibits no deformity. Right knee: Inspection- no visible swelling, no erythema Palpation-ttp anterior joint line, not warm to touch Lachmans, drawers negative Decreased mobility /rom secondary to pain 
+calf ttp, equivocal homans Skin: She is not diaphoretic. ASSESSMENT and PLAN 
  ICD-10-CM ICD-9-CM 1. Acute pain of right knee M25.561 719.46 XR KNEE RT MAX 2 VWS  
   CANCELED: DUPLEX UPPER EXT VENOUS BILAT CANCELED: DUPLEX LOWER EXT VENOUS BILAT  
obtain xrays knee Duplex to r/o dvt 
continues RICE She states she would slf refer to ortho asap. Precautions given

## 2019-01-23 ENCOUNTER — HOSPITAL ENCOUNTER (OUTPATIENT)
Dept: MRI IMAGING | Age: 49
Discharge: HOME | End: 2019-01-23
Attending: ORTHOPAEDIC SURGERY
Payer: COMMERCIAL

## 2019-01-23 DIAGNOSIS — S83.209A CURRENT TEAR OF SEMILUNAR CARTILAGE: ICD-10-CM

## 2019-01-23 PROCEDURE — 73721 MRI JNT OF LWR EXTRE W/O DYE: CPT

## 2019-03-11 ENCOUNTER — OFFICE VISIT (OUTPATIENT)
Dept: INTERNAL MEDICINE CLINIC | Age: 49
End: 2019-03-11

## 2019-03-11 VITALS
BODY MASS INDEX: 39.5 KG/M2 | TEMPERATURE: 98.8 F | OXYGEN SATURATION: 98 % | DIASTOLIC BLOOD PRESSURE: 78 MMHG | SYSTOLIC BLOOD PRESSURE: 120 MMHG | HEIGHT: 68 IN | WEIGHT: 260.6 LBS | RESPIRATION RATE: 16 BRPM | HEART RATE: 86 BPM

## 2019-03-11 DIAGNOSIS — J01.00 ACUTE NON-RECURRENT MAXILLARY SINUSITIS: ICD-10-CM

## 2019-03-11 DIAGNOSIS — J20.9 ACUTE BRONCHITIS, UNSPECIFIED ORGANISM: ICD-10-CM

## 2019-03-11 DIAGNOSIS — E66.01 MORBID OBESITY (HCC): ICD-10-CM

## 2019-03-11 DIAGNOSIS — J11.1 INFLUENZA: ICD-10-CM

## 2019-03-11 DIAGNOSIS — R05.9 COUGH: Primary | ICD-10-CM

## 2019-03-11 LAB
FLUAV RNA SPEC QL NAA+PROBE: POSITIVE
FLUBV RNA SPEC QL NAA+PROBE: NEGATIVE

## 2019-03-11 RX ORDER — OSELTAMIVIR PHOSPHATE 75 MG/1
75 CAPSULE ORAL 2 TIMES DAILY
Qty: 10 CAP | Refills: 0 | Status: SHIPPED | OUTPATIENT
Start: 2019-03-11 | End: 2019-03-16

## 2019-03-11 RX ORDER — AZITHROMYCIN 250 MG/1
250 TABLET, FILM COATED ORAL SEE ADMIN INSTRUCTIONS
Qty: 6 TAB | Refills: 0 | Status: SHIPPED | OUTPATIENT
Start: 2019-03-11 | End: 2019-03-16

## 2019-03-11 NOTE — PROGRESS NOTES
Identified pt with two pt identifiers(name and ). Reviewed record in preparation for visit and have obtained necessary documentation. Chief Complaint   Patient presents with    Cough    Headache        Health Maintenance Due   Topic    DTaP/Tdap/Td series (1 - Tdap)    PAP AKA CERVICAL CYTOLOGY     BREAST CANCER SCRN MAMMOGRAM        Coordination of Care Questionnaire:  :   1) Have you been to an emergency room, urgent care, or hospitalized since your last visit? If yes, where when, and reason for visit? yes [address] [facility] on 19 and 19      2. Have seen or consulted any other health care provider since your last visit? If yes, where when, and reason for visit? NO      3) Do you have an Advanced Directive/ Living Will in place? NO  If yes, do we have a copy on file NO  If no, would you like information NO    Patient is accompanied by self I have received verbal consent from FID3 to discuss any/all medical information while they are present in the room.

## 2019-03-11 NOTE — PROGRESS NOTES
Subjective:   Patti Goldberg is a 51 y.o. female      Chief Complaint   Patient presents with    Cough    Headache    Nasal Congestion    Ear Fullness        History of present illness: She presents noted she had some congestion with head and nasal congestion last week and and then it seemed to get better during the week and return again this weekend and yesterday she had a lot of nasal congestion and a result also has a lot of cough. She does feel like she has had maybe a low-grade fever but no chills no significant myalgias. She denies any shortness of breath but does feel like there is a lot of chest congestion.     Patient Active Problem List   Diagnosis Code    Morbid obesity ([facility] [address] E66.01    Incontinence R32    Joint pain M25.50    Gastroesophageal reflux disease without esophagitis K21.9    Indigestion K30    Edema of extremities R60.0    Primary osteoarthritis involving multiple joints M15.0    Acute laryngitis J04.0    Acute URI J06.9    Acute non-recurrent maxillary sinusitis J01.00    Mild intermittent asthma without complication U64.07    Annual physical exam Z00.00    Left lower quadrant pain R10.32    Colitis K52.9    Acute bronchitis J20.9    Influenza J11.1      Past Medical History:   Diagnosis Date    Asthma     Edema of extremities 7/27/2010    Incontinence 7/27/2010    Indigestion 7/27/2010    Joint pain 7/27/2010    Morbid obesity ([facility] 7/27/2010    Reflux 7/27/2010      No Known Allergies   Family History   Problem Relation Age of Onset    Hypertension Mother    [facility] Other Mother 76        aneurysm in her head    High Cholesterol Father     Heart Disease Father       Social History     Socioeconomic History    Marital status:      Spouse name: Not on file    Number of children: Not on file    Years of education: Not on file    Highest education level: Not on file   Social Needs    Financial resource strain: Not on file    Food insecurity - worry: Not on file    Food insecurity - inability: Not on file    Transportation needs - medical: Not on file   Adype needs - non-medical: Not on file   Occupational History    Not on file   Tobacco Use    Smoking status: Never Smoker    Smokeless tobacco: Never Used   Substance and Sexual Activity    Alcohol use: Yes     Comment: socially    Drug use: No    Sexual activity: Yes     Partners: Male     Birth control/protection: None   Other Topics Concern    Not on file   Social History Narrative    Not on file     Prior to Admission medications    Medication Sig Start Date End Date Taking? Authorizing Provider   oseltamivir (TAMIFLU) 75 mg capsule Take 1 Cap by mouth two (2) times a day for 5 days. 3/11/19 3/16/19 Yes Elvira Durham MD   azithromycin (ZITHROMAX) 250 mg tablet Take 1 Tab by mouth See Admin Instructions for 5 days. Take 2 tablets the first day, then 1 tablet daily for the next four days. 3/11/19 3/16/19 Yes Elvira Durham MD   PROAIR HFA 90 mcg/actuation inhaler TAKE 2 PUFFS BY MOUTH FOUR TIMES DAILY AS NEEDED 1/2/19  Yes Elvira Durham MD   clobetasol (TEMOVATE) 0.05 % topical cream APPLY TOPICALLY BID PRN 6/25/18  Yes Elvira Durham MD   MULTIVITAMINS (MULTI-VITAMIN PO) Take  by mouth daily. 5/29/10  Yes Other, MD Codey        Review of Systems              Constitutional:  She denies fever, weight loss, sweats or fatigue. EYES: No blurred or double vision,               ENT: Positive ahead and nasal congestion, no headache or dizziness. No difficulty with               swallowing, taste, speech or smell. Respiratory: Chest congestion cough without wheezing or shortness of breath. No sputum production. Cardiac:  Denies chest pain, palpitations, unexplained indigestion, syncope, edema, PND or orthopnea. GI:  No changes in bowel movements, no abdominal pain, no bloating, anorexia, nausea, vomiting or heartburn. :  No frequency or dysuria.   Denies incontinence or sexual dysfunction. Extremities:  No joint pain, stiffness or swelling  Back:.no pain or soreness  Skin:  No recent rashes or mole changes. Neurological:  No numbness, tingling, burning paresthesias or loss of motor strength. No syncope, dizziness, frequent headaches or memory loss. Hematologic:  No easy bruising  Lymphatic: No lymph node enlargement    Objective:     Vitals:    03/11/19 1103   BP: 120/78   Pulse: 86   Resp: 16   Temp: 98.8 °F (37.1 °C)   TempSrc: Oral   SpO2: 98%   Weight: 260 lb 9.6 oz (118.2 kg)   Height: 5' 8\" (1.727 m)   PainSc:   7   PainLoc: Generalized       Body mass index is 39.62 kg/m². Physical Examination:              General Appearance:  Well-developed, well-nourished, no acute distress. HEENT:      Ears:  The TMs and ear canals were clear. Eyes:  The pupillary responses were normal.  Extraocular muscle function intact. Lids and conjunctiva not injected. Funduscopic exam revealed sharp disc margins. Nares: Inflamed edematous  Pharynx:  Clear with teeth in good repair. No masses were noted. Neck:  Supple without thyromegaly or adenopathy. No JVD noted. No carotid                bruits. Lungs:  Clear to auscultation and percussion except some scattered coarse wheezes but good air movement. Cardiac:  Regular rate and rhythm without murmur. PMI not displaced. No gallop, rub or click. Abdominal: Soft, non-tender, no hepata-spleenomegally or masses  Extremities:  No clubbing, cyanosis or edema. Skin:  No rash or unusual mole changes noted. Lymph Nodes:  None felt in the cervical, supraclavicular, axillary or inguinal region. Neurological: . DTRs 2+ and symmetric. Station and gait normal.   Hematologic:   No purpura or petechiae        Assessment/Plan:         1. Cough    2. Acute non-recurrent maxillary sinusitis    3. Acute bronchitis, unspecified organism    4. Influenza    5.  Morbid obesity ([address] Impressions/Plan:  Impression  1. Cough with symptoms and findings consistent with acute bronchitis and I am not sure that is not a bacterial component although influenza test is positive for influenza A. Based upon this I am going to treat her for both bacterial and influenza we will respiratory infection. I did give her Tamiflu to take for 5 days and gave her Zithromax to take. 2.  Morbid obesity we did discuss diet, exercise and weight reduction  Recheck in a regular scheduled appointment    Orders Placed This Encounter    [facility]    oseltamivir (TAMIFLU) 75 mg capsule    azithromycin (ZITHROMAX) 250 mg tablet       Follow-up Disposition:  Return TBD. Results for orders placed or performed in visit on 03/11/19   RAPID INFLUENZA TEST A AND B   Result Value Ref Range    Influenza A Positive (A) Negative    Influenza B Negative Negative         Gerri Garcia MD    The patient was given after the visit summary the patient verbalized an understanding of the plans and problems as explained.

## 2019-03-11 NOTE — PATIENT INSTRUCTIONS

## 2019-05-02 PROBLEM — E78.5 DYSLIPIDEMIA: Status: ACTIVE | Noted: 2019-05-02

## 2019-05-02 NOTE — PROGRESS NOTES
Annual Wellness Visit HPI:  
 
Ольга Hill is a 51y.o. year old female who is here for her annual comprehensive healthcare exam and follow-up of her medical problems to include GERD, asthma, DJD, and morbid obesity. She is taking her medications and trying to follow her diet and try to get some exercise but knows that she needs to be much better with diet and exercise. She has had LAP-BAND 914 is not been able to get the weight down and keep it down. She currently denies any chest pain, shortness of breath, palpitations, PND, orthopnea or other cardiorespiratory complaints. There are no GI or  complaints. She denies any headaches, dizziness or neurologic complaints. She does have a lot of joint aches and pains and wants to be checked for rheumatoid arthritis and she does have psoriasis. Visit Vitals /88 Pulse 75 Temp 98.8 °F (37.1 °C) (Oral) Resp 19 Ht 5' 8\" (1.727 m) Wt 260 lb 6.4 oz (118.1 kg) SpO2 98% BMI 39.59 kg/m² Past Medical History:  
Diagnosis Date  Asthma  Diverticulitis 09/2018  Edema of extremities 7/27/2010  Incontinence 7/27/2010  Indigestion 7/27/2010  Joint pain 7/27/2010  Morbid obesity ([address] 7/27/2010  Reflux 7/27/2010 Past Surgical History:  
Procedure Laterality Date [address] [place] UNLISTED Lap band '08, hernia  HX ORTHOPAEDIC    
 trigger finger  LAP, PLACE ADJUST GASTR BAND  12/4/08 Prior to Admission medications Medication Sig Start Date End Date Taking? Authorizing Provider  
cetirizine (ZYRTEC) 10 mg tablet Take  by mouth. Yes Provider, Historical  
PROAIR HFA 90 mcg/actuation inhaler TAKE 2 PUFFS BY MOUTH FOUR TIMES DAILY AS NEEDED 1/2/19  Yes Jolly Kehr, MD  
clobetasol (TEMOVATE) 0.05 % topical cream APPLY TOPICALLY BID PRN 6/25/18  Yes Jolly Kehr, MD  
MULTIVITAMINS (MULTI-VITAMIN PO) Take  by mouth daily.  5/29/10  Yes Other, MD Codey  
  
 
 No Known Allergies Family History Problem Relation Age of Onset  Hypertension Mother  Other Mother 76  
     aneurysm in her head  High Cholesterol Father  Heart Disease Father Social History Socioeconomic History  Marital status:  Spouse name: Not on file  Number of children: Not on file  Years of education: Not on file  Highest education level: Not on file Occupational History  Not on file Social Needs  Financial resource strain: Not on file  Food insecurity:  
  Worry: Not on file Inability: Not on file  Transportation needs:  
  Medical: Not on file Non-medical: Not on file Tobacco Use  Smoking status: Never Smoker  Smokeless tobacco: Never Used Substance and Sexual Activity  Alcohol use: Yes Comment: socially  Drug use: No  
 Sexual activity: Yes  
  Partners: Male Birth control/protection: None Lifestyle  Physical activity:  
  Days per week: Not on file Minutes per session: Not on file  Stress: Not on file Relationships  Social connections:  
  Talks on phone: Not on file Gets together: Not on file Attends Judaism service: Not on file Active member of club or organization: Not on file Attends meetings of clubs or organizations: Not on file Relationship status: Not on file  Intimate partner violence:  
  Fear of current or ex partner: Not on file Emotionally abused: Not on file Physically abused: Not on file Forced sexual activity: Not on file Other Topics Concern  Not on file Social History Narrative  Not on file ROS:  
 
Constitutional: She denies fevers, weight loss, sweats. Eyes: No blurred or double vision. ENT: No difficulty with swallowing, taste, speech or smell. NECK: no stiffness swelling or lymph node enlargement Respiratory: No cough wheezing or shortness of breath. Cardiovascular: Denies chest pain, palpitations, unexplained indigestion or syncope. Breast: She has noted no masses or lumps and no discharge or axillary swelling Gastrointestinal:  No changes in bowel movements, no abdominal pain, no bloating. Genitourinary: No discharge or abnormal bleeding or pain Extremities: No joint pain, stiffness or swelling. Neurological:  No numbness, tingling, burring paresthesias or loss of motor strength. No syncope, dizziness or frequent headache Skin:  No recent rashes or mole changes. Psychiatric/Behavioral:  Negative for depression. The patient is not nervous/anxious. HEMATOLOGIC: no easy bruising or bleeding gums Endocrine: no sweats of urinary frequency or excessive thirst 
 
 
Physical Examination:  
 
Vitals:  
 05/03/19 1522 05/03/19 0401 BP: 120/90 122/88 Pulse: 75 Resp: 19 Temp: 98.8 °F (37.1 °C) TempSrc: Oral   
SpO2: 98% Weight: 260 lb 6.4 oz (118.1 kg) Height: 5' 8\" (1.727 m) PainSc:   0 - No pain General appearance - alert, well appearing, and in no distress Mental status - alert, oriented to person, place, and time HEENT: 
Ears - bilateral TM's and external ear canals clear Eyes - pupillary responses were normal.  Extraocular muscle function intact. Lids and conjunctiva not injected. Fundoscopic exam revealed sharp disc margins. eye movements intact Pharynx- clear with teeth in good repair. No masses were noted Neck - supple without thyromegaly or burit. No JVD noted Lungs - clear to auscultation and percussion Cardiac- normal rate, regular rhythm without murmurs. PMI not displaced. No gallop, rub or click Breast: deferred to GYN Abdomen - flat, soft, non-tender without palpable organomegaly or mass. No pulsatile mass was felt, and not bruit was heard. Bowel sounds were active  Female - deferred to GYN Rectal - deferred to GYN Extremities -  no clubbing cyanosis or edema Lymphatics - no palpable lymphadenopathy, no hepatosplenomegaly Peripheral vascular - Dorsalis pedis and posterior tibial pulses felt without difficulty Skin - no rash or unusual mole change noted Neurological - Cranial nerves II-XII grossly intact. Motor strength 5/5. DTR's 2+ and symmetric. Station and gait normal 
Back exam - full range of motion, no tenderness, palpable spasm or pain on motion Musculoskeletal - no joint tenderness, deformity or swelling Hematologic: no purpura, petechiae or bruising Assessment/Plan: 1. Annual physical exam   
2. Mild intermittent asthma without complication 3. Primary osteoarthritis involving multiple joints 4. Morbid obesity ([address] 5. Gastroesophageal reflux disease without esophagitis 6. Arthralgia, unspecified joint Impression 1. Annual physical examination normal except for morbid obesity we discussed diet, weight reduction and overall health benefit. 2.  Asthma that is currently stable 3. DJD stable with multiple joint aches and pains however I want to check some other arthritic studies. 4.  Morbid obesity we discussed diet, exercise and weight reduction for overall health benefit. 5.  GERD that is stable 6. Arthralgias multiple joints certainly have to consider psoriatic arthritis but we will see what the results are of the blood test 
I will call with lab and make further recommendations or adjustments if necessary. Follow-up as scheduled again for yearly or sooner if there is a problem. Orders Placed This Encounter  CBC WITH AUTOMATED DIFF ([facility])  METABOLIC PANEL, COMPREHENSIVE ([facility])  LIPID PANEL ([facility])  T4, FREE ([facility])  TSH 3RD GENERATION ([facility])  URINALYSIS W/O MICRO ([facility])  RHEUMATOID FACTOR, QL  
 MELISSA BY IFA REFLEX  C REACTIVE PROTEIN, QT  
 SED RATE (ESR) ([facility])  cetirizine (ZYRTEC) 10 mg tablet Sig: Take  by mouth. No results found for any visits on 05/03/19. I have reviewed the patient's medical history in detail and updated the computerized patient record. We had a prolonged discussion about these complex clinical issues and went over the various important aspects to consider. All questions were answered. Advised her to call back or return to office if symptoms do not improve, change in nature, or persist. 
 
She was given an after visit summary or informed of [facility] which includes patient instructions, diagnoses, current medications, & vitals. She expressed understanding with the diagnosis and plan.   
  
Tanya Suazo MD

## 2019-05-03 ENCOUNTER — OFFICE VISIT (OUTPATIENT)
Dept: INTERNAL MEDICINE CLINIC | Age: 49
End: 2019-05-03

## 2019-05-03 VITALS
WEIGHT: 260.4 LBS | TEMPERATURE: 98.8 F | BODY MASS INDEX: 39.47 KG/M2 | DIASTOLIC BLOOD PRESSURE: 88 MMHG | RESPIRATION RATE: 19 BRPM | HEIGHT: 68 IN | OXYGEN SATURATION: 98 % | SYSTOLIC BLOOD PRESSURE: 122 MMHG | HEART RATE: 75 BPM

## 2019-05-03 DIAGNOSIS — Z00.00 ANNUAL PHYSICAL EXAM: Primary | ICD-10-CM

## 2019-05-03 DIAGNOSIS — E66.01 MORBID OBESITY (HCC): ICD-10-CM

## 2019-05-03 DIAGNOSIS — M25.50 ARTHRALGIA, UNSPECIFIED JOINT: ICD-10-CM

## 2019-05-03 DIAGNOSIS — J45.20 MILD INTERMITTENT ASTHMA WITHOUT COMPLICATION: ICD-10-CM

## 2019-05-03 DIAGNOSIS — K21.9 GASTROESOPHAGEAL REFLUX DISEASE WITHOUT ESOPHAGITIS: ICD-10-CM

## 2019-05-03 DIAGNOSIS — M15.9 PRIMARY OSTEOARTHRITIS INVOLVING MULTIPLE JOINTS: ICD-10-CM

## 2019-05-03 LAB
A-G RATIO,AGRAT: 1.4 RATIO
ALBUMIN SERPL-MCNC: 4.1 G/DL (ref 3.9–5.4)
ALP SERPL-CCNC: 65 U/L (ref 38–126)
ALT SERPL-CCNC: 12 U/L (ref 9–52)
ANION GAP SERPL CALC-SCNC: 14 MMOL/L
AST SERPL W P-5'-P-CCNC: 17 U/L (ref 14–36)
BILIRUB SERPL-MCNC: 0.6 MG/DL (ref 0.2–1.3)
BUN SERPL-MCNC: 14 MG/DL (ref 7–17)
BUN/CREATININE RATIO,BUCR: 20 RATIO
CALCIUM SERPL-MCNC: 9.4 MG/DL (ref 8.4–10.2)
CHLORIDE SERPL-SCNC: 103 MMOL/L (ref 98–107)
CHOL/HDL RATIO,CHHD: 3 RATIO (ref 0–4)
CHOLEST SERPL-MCNC: 171 MG/DL (ref 0–200)
CO2 SERPL-SCNC: 25 MMOL/L (ref 22–32)
CREAT SERPL-MCNC: 0.7 MG/DL (ref 0.7–1.2)
ERYTHROCYTE [DISTWIDTH] IN BLOOD BY AUTOMATED COUNT: 12.2 %
GLOBULIN,GLOB: 2.9
GLUCOSE SERPL-MCNC: 98 MG/DL (ref 65–105)
HCT VFR BLD AUTO: 40.2 % (ref 37–51)
HDLC SERPL-MCNC: 55 MG/DL (ref 35–130)
HGB BLD-MCNC: 13.6 G/DL (ref 12–18)
LDL/HDL RATIO,LDHD: 2 RATIO
LDLC SERPL CALC-MCNC: 103 MG/DL (ref 0–130)
LYMPHOCYTES ABSOLUTE: 1.7 K/UL (ref 0.6–4.1)
LYMPHOCYTES NFR BLD: 26.4 % (ref 10–58.5)
MCH RBC QN AUTO: 30.9 PG (ref 26–32)
MCHC RBC AUTO-ENTMCNC: 33.8 G/DL (ref 30–36)
MCV RBC AUTO: 91.3 FL (ref 80–97)
MONOCYTES ABS-DIF,2141: 0.5 K/UL (ref 0–1.8)
MONOCYTES NFR BLD: 8.2 % (ref 0.1–24)
NEUTROPHILS # BLD: 65.4 % (ref 37–92)
NEUTROPHILS ABS,2156: 4.3 K/UL (ref 2–7.8)
PLATELET # BLD AUTO: 401 K/UL (ref 140–440)
PMV BLD AUTO: 7.5 FL
POTASSIUM SERPL-SCNC: 4.4 MMOL/L (ref 3.6–5)
PROT SERPL-MCNC: 7 G/DL (ref 6.3–8.2)
RBC # BLD AUTO: 4.4 M/UL (ref 4.2–6.3)
SED RATE (ESR): 20 MM/HR (ref 0–20)
SODIUM SERPL-SCNC: 142 MMOL/L (ref 137–145)
TRIGL SERPL-MCNC: 65 MG/DL (ref 0–200)
TSH SERPL DL<=0.05 MIU/L-ACNC: 1.23 UIU/ML (ref 0.34–5.6)
VLDLC SERPL CALC-MCNC: 13 MG/DL
WBC # BLD AUTO: 6.6 K/UL (ref 4.1–10.9)

## 2019-05-03 RX ORDER — CETIRIZINE HCL 10 MG
TABLET ORAL
COMMUNITY

## 2019-05-03 NOTE — PATIENT INSTRUCTIONS
Arthritis: Care Instructions Your Care Instructions Arthritis, also called osteoarthritis, is a breakdown of the cartilage that cushions your joints. When the cartilage wears down, your bones rub against each other. This causes pain and stiffness. Many people have some arthritis as they age. Arthritis most often affects the joints of the spine, hands, hips, knees, or feet. You can take simple measures to protect your joints, ease your pain, and help you stay active. Follow-up care is a key part of your treatment and safety. Be sure to make and go to all appointments, and call your doctor if you are having problems. It's also a good idea to know your test results and keep a list of the medicines you take. How can you care for yourself at home? · Stay at a healthy weight. Being overweight puts extra strain on your joints. · Talk to your doctor or physical therapist about exercises that will help ease joint pain. ? Stretch. You may enjoy gentle forms of yoga to help keep your joints and muscles flexible. ? Walk instead of jog. Other types of exercise that are less stressful on the joints include riding a bicycle, swimming, pauline chi, or water exercise. ? Lift weights. Strong muscles help reduce stress on your joints. Stronger thigh muscles, for example, take some of the stress off of the knees and hips. Learn the right way to lift weights so you do not make joint pain worse. · Take your medicines exactly as prescribed. Call your doctor if you think you are having a problem with your medicine. · Take pain medicines exactly as directed. ? If the doctor gave you a prescription medicine for pain, take it as prescribed. ? If you are not taking a prescription pain medicine, ask your doctor if you can take an over-the-counter medicine. · Use a cane, crutch, walker, or another device if you need help to get around. These can help rest your joints.  You also can use other things to make life easier, such as a higher toilet seat and padded handles on kitchen utensils. · Do not sit in low chairs, which can make it hard to get up. · Put heat or cold on your sore joints as needed. Use whichever helps you most. You also can take turns with hot and cold packs. ? Apply heat 2 or 3 times a day for 20 to 30 minutesusing a heating pad, hot shower, or hot packto relieve pain and stiffness. ? Put ice or a cold pack on your sore joint for 10 to 20 minutes at a time. Put a thin cloth between the ice and your skin. When should you call for help? Call your doctor now or seek immediate medical care if: 
  · You have sudden swelling, warmth, or pain in any joint.  
  · You have joint pain and a fever or rash.  
  · You have such bad pain that you cannot use a joint.  
 Watch closely for changes in your health, and be sure to contact your doctor if: 
  · You have mild joint symptoms that continue even with more than 6 weeks of care at home.  
  · You have stomach pain or other problems with your medicine. Where can you learn more? Go to http://ravindra-bobby.info/. Enter R235 in the search box to learn more about \"Arthritis: Care Instructions. \" Current as of: Belen 10, 2018 Content Version: 11.9 © 7645-0711 [employer]. Care instructions adapted under license by [employer] (which disclaims liability or warranty for this information). If you have questions about a medical condition or this instruction, always ask your healthcare professional. [place] [address] any warranty or liability for your use of this information.

## 2019-05-03 NOTE — PROGRESS NOTES
Basilia Aranda  Identified pt with two pt identifiers(name and ). Chief Complaint Patient presents with [facility] Annual Wellness Visit 1. Have you been to the ER, urgent care clinic since your last visit? Hospitalized since your last visit? No 
 
2. Have you seen or consulted any other health care providers outside of the [address] [place] [address] since your last visit? Include any pap smears or colon screening. Yes, [facility] Dr. Miladis Wray on 4/10/19 for a tore meniscus from waking up from a nap and couldn't walk. Also a Mammogram at [facility] for an annual checkup and everything was okay, went on 2019. Health Maintenance Topics with due status: Overdue Topic Date Due DTaP/Tdap/Td series 1990 PAP AKA CERVICAL CYTOLOGY 1990 Health Maintenance Topics with due status: Due Soon Topic Date Due  
 BREAST CANCER SCRN MAMMOGRAM 2019 Health Maintenance Topics with due status: Not Due Topic Last Completion Date Influenza Age 5 to Adult 2017 Health Maintenance Topics with due status: Completed Topic Last Completion Date Pneumococcal 0-64 years 2018 Medication reconciliation up to date and corrected with patient at this time. Today's provider has been notified of reason for visit, vitals and flowsheets obtained on patients. Reviewed record in preparation for visit, huddled with provider and have obtained necessary documentation. Wt Readings from Last 3 Encounters:  
19 260 lb 6.4 oz (118.1 kg) 19 260 lb 9.6 oz (118.2 kg) 19 266 lb (120.7 kg) Temp Readings from Last 3 Encounters:  
19 98.8 °F (37.1 °C) (Oral) 19 98.8 °F (37.1 °C) (Oral) 19 98.4 °F (36.9 °C) (Oral) BP Readings from Last 3 Encounters:  
19 120/90  
19 120/78  
19 145/85 Pulse Readings from Last 3 Encounters:  
19 75  
19 86  
19 78  
 
 Vitals:  
 05/03/19 2779 BP: 120/90 Pulse: 75 Resp: 19 Temp: 98.8 °F (37.1 °C) TempSrc: Oral  
SpO2: 98% Weight: 260 lb 6.4 oz (118.1 kg) Height: 5' 8\" (1.727 m) PainSc:   0 - No pain Learning Assessment: 
:  
 
Learning Assessment 7/24/2017 PRIMARY LEARNER Patient HIGHEST LEVEL OF EDUCATION - PRIMARY LEARNER  2 YEARS OF COLLEGE  
CO-LEARNER CAREGIVER No  
PRIMARY LANGUAGE ENGLISH  
LEARNER PREFERENCE PRIMARY LISTENING  
ANSWERED BY self RELATIONSHIP SELF Depression Screening: 
:  
 
3 most recent PHQ Screens 3/11/2019 Little interest or pleasure in doing things Not at all Feeling down, depressed, irritable, or hopeless Not at all Total Score PHQ 2 0 No flowsheet data found. Fall Risk Assessment: 
:  
 
No flowsheet data found. Abuse Screening: 
:  
 
Abuse Screening Questionnaire 5/3/2019 7/24/2017 Do you ever feel afraid of your partner? Rand Lemming Are you in a relationship with someone who physically or mentally threatens you? [place] Lemming Is it safe for you to go home? Y Y  
 
 
ADL Screening: 
:  
 
No flowsheet data found.

## 2019-05-04 LAB — T4 FREE SERPL-MCNC: 1.05 NG/DL (ref 0.58–2.3)

## 2019-05-07 LAB
ANA TITR SER IF: NEGATIVE {TITER}
CRP SERPL-MCNC: 8.1 MG/L (ref 0–4.9)
RHEUMATOID FACT SERPL-ACNC: 10.1 IU/ML (ref 0–13.9)

## 2019-05-08 NOTE — PROGRESS NOTES
Rheumatoid factor and MLEISSA are both normal however the C-reactive protein is elevated and with her psoriasis with joint aches and pains psoriatic arthritis comes to mind so I would set her up for rheumatology appointment

## 2019-05-10 DIAGNOSIS — M25.50 ARTHRALGIA, UNSPECIFIED JOINT: Primary | ICD-10-CM

## 2019-05-10 LAB
BILIRUB UR QL: NEGATIVE
CLARITY: CLEAR
COLOR UR: NORMAL
GLUCOSE 24H UR-MRATE: NEGATIVE G/(24.H)
HGB UR QL STRIP: NEGATIVE
KETONES UR QL STRIP.AUTO: NEGATIVE
LEUKOCYTE ESTERASE: NEGATIVE
NITRITE UR QL STRIP.AUTO: NEGATIVE
PH UR STRIP: 5 [PH] (ref 5–7)
PROT UR STRIP-MCNC: NEGATIVE MG/DL
SP GR UR REFRACTOMETRY: 1.02 (ref 1–1.03)
UROBILINOGEN UR QL STRIP.AUTO: NEGATIVE

## 2019-05-10 NOTE — PROGRESS NOTES
Discussed lab with patient. Referral generated for Rheumatologist.  Referral coordinator to schedule and call patient with appointment date and time.

## 2019-07-01 RX ORDER — CLOBETASOL PROPIONATE 0.5 MG/G
CREAM TOPICAL
Qty: 60 G | Refills: 5 | Status: SHIPPED | OUTPATIENT
Start: 2019-07-01 | End: 2020-03-02

## 2019-07-01 NOTE — TELEPHONE ENCOUNTER
RX refill request from the patient/pharmacy. Patient last seen 05- with labs, and next appt. scheduled for 07-  Requested Prescriptions     Pending Prescriptions Disp Refills    clobetasol (TEMOVATE) 0.05 % topical cream [Pharmacy Med Name: CLOBETASOL PROP 0.05% CREAM 60GM] 60 g 5     Sig: APPLY EXTERNALLY TO THE AFFECTED AREA TWICE DAILY AS NEEDED   .

## 2019-07-24 ENCOUNTER — OFFICE VISIT (OUTPATIENT)
Dept: INTERNAL MEDICINE CLINIC | Age: 49
End: 2019-07-24

## 2019-07-24 VITALS
DIASTOLIC BLOOD PRESSURE: 78 MMHG | SYSTOLIC BLOOD PRESSURE: 120 MMHG | TEMPERATURE: 99.5 F | HEART RATE: 80 BPM | OXYGEN SATURATION: 98 % | HEIGHT: 68 IN | WEIGHT: 263.6 LBS | RESPIRATION RATE: 16 BRPM | BODY MASS INDEX: 39.95 KG/M2

## 2019-07-24 DIAGNOSIS — J01.00 ACUTE NON-RECURRENT MAXILLARY SINUSITIS: Primary | ICD-10-CM

## 2019-07-24 RX ORDER — METHOTREXATE 2.5 MG/1
TABLET ORAL
Refills: 3 | COMMUNITY
Start: 2019-06-30 | End: 2020-01-08 | Stop reason: ALTCHOICE

## 2019-07-24 RX ORDER — FOLIC ACID 1 MG/1
TABLET ORAL
COMMUNITY

## 2019-07-24 RX ORDER — LEVOFLOXACIN 500 MG/1
500 TABLET, FILM COATED ORAL DAILY
Qty: 10 TAB | Refills: 0 | Status: SHIPPED | OUTPATIENT
Start: 2019-07-24 | End: 2020-01-08 | Stop reason: ALTCHOICE

## 2019-07-24 NOTE — PATIENT INSTRUCTIONS
Cough: Care Instructions  Your Care Instructions    A cough is your body's response to something that bothers your throat or airways. Many things can cause a cough. You might cough because of a cold or the flu, bronchitis, or asthma. Smoking, postnasal drip, allergies, and stomach acid that backs up into your throat also can cause coughs. A cough is a symptom, not a disease. Most coughs stop when the cause, such as a cold, goes away. You can take a few steps at home to cough less and feel better. Follow-up care is a key part of your treatment and safety. Be sure to make and go to all appointments, and call your doctor if you are having problems. It's also a good idea to know your test results and keep a list of the medicines you take. How can you care for yourself at home? · Drink lots of water and other fluids. This helps thin the mucus and soothes a dry or sore throat. Honey or lemon juice in hot water or tea may ease a dry cough. · Take cough medicine as directed by your doctor. · Prop up your head on pillows to help you breathe and ease a dry cough. · Try cough drops to soothe a dry or sore throat. Cough drops don't stop a cough. Medicine-flavored cough drops are no better than candy-flavored drops or hard candy. · Do not smoke. Avoid secondhand smoke. If you need help quitting, talk to your doctor about stop-smoking programs and medicines. These can increase your chances of quitting for good. When should you call for help? Call 911 anytime you think you may need emergency care.  For example, call if:    · You have severe trouble breathing.    Call your doctor now or seek immediate medical care if:    · You cough up blood.     · You have new or worse trouble breathing.     · You have a new or higher fever.     · You have a new rash.    Watch closely for changes in your health, and be sure to contact your doctor if:    · You cough more deeply or more often, especially if you notice more mucus or a change in the color of your mucus.     · You have new symptoms, such as a sore throat, an earache, or sinus pain.     · You do not get better as expected. Where can you learn more? Go to http://ravindra-bobby.info/. Enter D279 in the search box to learn more about \"Cough: Care Instructions. \"  Current as of: September 5, 2018  Content Version: 12.1  © 4981-3002 [employer]. Care instructions adapted under license by [employer] (which disclaims liability or warranty for this information). If you have questions about a medical condition or this instruction, always ask your healthcare professional. [address] any warranty or liability for your use of this information. Asthma Attack: Care Instructions  Your Care Instructions    During an asthma attack, the airways swell and narrow. This makes it hard to breathe. Severe asthma attacks can be life-threatening, but you can help prevent them by keeping your asthma under control and treating symptoms before they get bad. Symptoms include being short of breath, having chest tightness, coughing, and wheezing. Noting and treating these symptoms can also help you avoid future trips to the emergency room. The doctor has checked you carefully, but problems can develop later. If you notice any problems or new symptoms, get medical treatment right away. Follow-up care is a key part of your treatment and safety. Be sure to make and go to all appointments, and call your doctor if you are having problems. It's also a good idea to know your test results and keep a list of the medicines you take. How can you care for yourself at home? · Follow your asthma action plan to prevent and treat attacks. If you don't have an asthma action plan, work with your doctor to create one. · Take your asthma medicines exactly as prescribed.  Talk to your doctor right away if you have any questions about how to take them.  ? Use your quick-relief medicine when you have symptoms of an attack. Quick-relief medicine is usually an albuterol inhaler. Some people need to use quick-relief medicine before they exercise. ? Take your controller medicine every day, not just when you have symptoms. Controller medicine is usually an inhaled corticosteroid. The goal is to prevent problems before they occur. Don't use your controller medicine to treat an attack that has already started. It doesn't work fast enough to help. ? If your doctor prescribed corticosteroid pills to use during an attack, take them exactly as prescribed. It may take hours for the pills to work, but they may make the episode shorter and help you breathe better. ? Keep your quick-relief medicine with you at all times. · Talk to your doctor before using other medicines. Some medicines, such as aspirin, can cause asthma attacks in some people. · If you have a peak flow meter, use it to check how well you are breathing. This can help you predict when an asthma attack is going to occur. Then you can take medicine to prevent the asthma attack or make it less severe. · Do not smoke or allow others to smoke around you. Avoid smoky places. Smoking makes asthma worse. If you need help quitting, talk to your doctor about stop-smoking programs and medicines. These can increase your chances of quitting for good. · Learn what triggers an asthma attack for you, and avoid the triggers when you can. Common triggers include colds, smoke, air pollution, dust, pollen, mold, pets, cockroaches, stress, and cold air. · Avoid colds and the flu. Get a pneumococcal vaccine shot. If you have had one before, ask your doctor if you need a second dose. Get a flu vaccine every fall. If you must be around people with colds or the flu, wash your hands often. When should you call for help? Call 911 anytime you think you may need emergency care.  For example, call if:    · You have severe trouble breathing.    Call your doctor now or seek immediate medical care if:    · Your symptoms do not get better after you have followed your asthma action plan.     · You have new or worse trouble breathing.     · Your coughing and wheezing get worse.     · You cough up dark brown or bloody mucus (sputum).     · You have a new or higher fever.    Watch closely for changes in your health, and be sure to contact your doctor if:    · You need to use quick-relief medicine on more than 2 days a week (unless it is just for exercise).     · You cough more deeply or more often, especially if you notice more mucus or a change in the color of your mucus.     · You are not getting better as expected. Where can you learn more? Go to http://ravindra-bobby.info/. Enter F444 in the search box to learn more about \"Asthma Attack: Care Instructions. \"  Current as of: September 5, 2018  Content Version: 12.1  © 5943-4953 [employer], [employer]. Care instructions adapted under license by [employer] (which disclaims liability or warranty for this information). If you have questions about a medical condition or this instruction, always ask your healthcare professional. [place] [address] any warranty or liability for your use of this information.

## 2019-07-24 NOTE — PROGRESS NOTES
Chief Complaint   Patient presents with    Cough     exposed to mold several days ago    Headache    Nasal Congestion     1. Have you been to the ER, urgent care clinic since your last visit? Hospitalized since your last visit? No    2. Have you seen or consulted any other health care providers outside of the [address] [place] [address] since your last visit? Include any pap smears or colon screening. Saw Dr. Kamla Dunbar Rheumatologist May 20, 2019, June 6, 2019 and again 7-22, 2019. Saw Dr. Verito Lopez for f/up s/p lap band 5-. Mammogram 6- , GYN visit June 19, 2019 at [facility].

## 2019-07-24 NOTE — PROGRESS NOTES
Subjective:   Elissa Gallego is a 51 y.o. female      Chief Complaint   Patient presents with    Cough     exposed to mold several days ago    Headache    Nasal Congestion        History of present illness: She presents with a lot of head and nasal sinus congestion with frontal headaches been going on for several days. She was exposed to some mold before this started when at that played some role in it. She has noted no fevers or chills and no shortness of breath or cough. She does not feel like her asthma is flared up.     Patient Active Problem List   Diagnosis Code    Morbid obesity ([facility] [address] E66.01    Incontinence R32    Joint pain M25.50    Gastroesophageal reflux disease without esophagitis K21.9    Indigestion K30    Edema of extremities R60.0    Primary osteoarthritis involving multiple joints M15.0    Acute laryngitis J04.0    Acute URI J06.9    Acute non-recurrent maxillary sinusitis J01.00    Mild intermittent asthma without complication J97.48    Annual physical exam Z00.00    Left lower quadrant pain R10.32    Colitis K52.9    Acute bronchitis J20.9    Influenza J11.1    Dyslipidemia E78.5      Past Medical History:   Diagnosis Date    Asthma     Diverticulitis 09/2018    Edema of extremities 7/27/2010    Incontinence 7/27/2010    Indigestion 7/27/2010    Joint pain 7/27/2010    Morbid obesity ([facility] [address] 7/27/2010    Reflux 7/27/2010      No Known Allergies   Family History   Problem Relation Age of Onset    Hypertension Mother    [address] Other Mother 76        aneurysm in her head    High Cholesterol Father     Heart Disease Father       Social History     Socioeconomic History    Marital status:      Spouse name: Not on file    Number of children: Not on file    Years of education: Not on file    Highest education level: Not on file   Occupational History    Not on file   Social Needs    Financial resource strain: Not on file    Food insecurity:     Worry: Not on file     Inability: Not on file    Transportation needs:     Medical: Not on file     Non-medical: Not on file   Tobacco Use    Smoking status: Never Smoker    Smokeless tobacco: Never Used   Substance and Sexual Activity    Alcohol use: Yes     Comment: socially    Drug use: No    Sexual activity: Yes     Partners: Male     Birth control/protection: None   Lifestyle    Physical activity:     Days per week: Not on file     Minutes per session: Not on file    Stress: Not on file   Relationships    Social connections:     Talks on phone: Not on file     Gets together: Not on file     Attends Am[employer] service: Not on file     Active member of club or organization: Not on file     Attends meetings of clubs or organizations: Not on file     Relationship status: Not on file    Intimate partner violence:     Fear of current or ex partner: Not on file     Emotionally abused: Not on file     Physically abused: Not on file     Forced sexual activity: Not on file   Other Topics Concern    Not on file   Social History Narrative    Not on file     Prior to Admission medications    Medication Sig Start Date End Date Taking? Authorizing Provider   folic acid (FOLVITE) 1 mg tablet folic acid   Yes Provider, Historical   methotrexate (RHEUMATREX) 2.5 mg tablet TK 5 TS PO WEEKLY ON THE SAME DAY EVERY WEEK 6/30/19  Yes Provider, Historical   levoFLOXacin (LEVAQUIN) 500 mg tablet Take 1 Tab by mouth daily. 7/24/19  Yes Estephania Brandon MD   clobetasol (TEMOVATE) 0.05 % topical cream APPLY EXTERNALLY TO THE AFFECTED AREA TWICE DAILY AS NEEDED 7/1/19  Yes Estephania Brandon MD   cetirizine (ZYRTEC) 10 mg tablet Take  by mouth. Yes Provider, Historical   PROAIR HFA 90 mcg/actuation inhaler TAKE 2 PUFFS BY MOUTH FOUR TIMES DAILY AS NEEDED 1/2/19  Yes Estephania Brandon MD   MULTIVITAMINS (MULTI-VITAMIN PO) Take  by mouth daily.  5/29/10  Yes Other, MD Codey        Review of Systems              Constitutional:  She denies fever, weight loss, sweats or fatigue. EYES: No blurred or double vision,               ENT: Frontal headache with head and nasal congestion, no headache or dizziness. No difficulty with               swallowing, taste, speech or smell. Respiratory:  No cough, wheezing or shortness of breath. No sputum production. Cardiac:  Denies chest pain, palpitations, unexplained indigestion, syncope, edema, PND or orthopnea. GI:  No changes in bowel movements, no abdominal pain, no bloating, anorexia, nausea, vomiting or heartburn. :  No frequency or dysuria. Denies incontinence or sexual dysfunction. Extremities:  No joint pain, stiffness or swelling  Back:.no pain or soreness  Skin:  No recent rashes or mole changes. Neurological:  No numbness, tingling, burning paresthesias or loss of motor strength. No syncope, dizziness, frequent headaches or memory loss. Hematologic:  No easy bruising  Lymphatic: No lymph node enlargement    Objective:     Vitals:    07/24/19 1635   BP: 120/78   Pulse: 80   Resp: 16   Temp: 99.5 °F (37.5 °C)   SpO2: 98%   Weight: 263 lb 9.6 oz (119.6 kg)   Height: 5' 8\" (1.727 m)   PainSc:   0 - No pain       Body mass index is 40.08 kg/m². Physical Examination:              General Appearance:  Well-developed, well-nourished, no acute distress. HEENT:      Ears:  The TMs and ear canals were clear. Eyes:  The pupillary responses were normal.  Extraocular muscle function intact. Lids and conjunctiva not injected. Funduscopic exam revealed sharp disc margins. Nares: Inflamed edematous  Pharynx:  Clear with teeth in good repair. No masses were noted. Neck:  Supple without thyromegaly or adenopathy. No JVD noted. No carotid                bruits. Lungs:  Clear to auscultation and percussion. Cardiac:  Regular rate and rhythm without murmur. PMI not displaced. No gallop, rub or click.   Abdominal: Soft, non-tender, no hepata-spleenomegally or masses  Extremities:  No clubbing, cyanosis or edema. Skin:  No rash or unusual mole changes noted. Lymph Nodes:  None felt in the cervical, supraclavicular, axillary or inguinal region. Neurological: . DTRs 2+ and symmetric. Station and gait normal.   Hematologic:   No purpura or petechiae        Assessment/Plan:         1. Acute non-recurrent maxillary sinusitis        Impressions/Plan:  Impression  1. URI sinusitis we will treat this with Levaquin for 10 days I did warn her sun sensitivity  Recheck if not resolved. Orders Placed This Encounter    folic acid (FOLVITE) 1 mg tablet    methotrexate (RHEUMATREX) 2.5 mg tablet    levoFLOXacin (LEVAQUIN) 500 mg tablet           No results found for any visits on 07/24/19. Roxanne Worrell MD    The patient was given after the visit summary the patient verbalized an understanding of the plans and problems as explained.

## 2019-07-26 ENCOUNTER — TELEPHONE (OUTPATIENT)
Dept: INTERNAL MEDICINE CLINIC | Age: 49
End: 2019-07-26

## 2019-07-26 RX ORDER — AZITHROMYCIN 250 MG/1
250 TABLET, FILM COATED ORAL SEE ADMIN INSTRUCTIONS
Qty: 6 TAB | Refills: 0 | Status: SHIPPED | OUTPATIENT
Start: 2019-07-26 | End: 2019-07-31

## 2019-07-26 NOTE — TELEPHONE ENCOUNTER
Patient called to report she has taken two doses of Levaquin and has noticed that she feels very jittery and the back of her legs hurting especially her ankles. Discussed with Dr. Fartun Rivera and will change to a carolann jolly, take as directed. Verbal order given. Called to patient's pharmacy.

## 2019-07-26 NOTE — TELEPHONE ENCOUNTER
RX refill request from the patient/pharmacy. Patient last seen 07- with labs, and next appt. scheduled for 05-  Requested Prescriptions     Pending Prescriptions Disp Refills    azithromycin (ZITHROMAX) 250 mg tablet 6 Tab 0     Sig: Take 1 Tab by mouth See Admin Instructions for 5 days. Take 2 tablets the first day, then 1 tablet daily for the next four days. Ciera Carlin

## 2019-09-24 PROBLEM — Z00.00 ANNUAL PHYSICAL EXAM: Status: RESOLVED | Noted: 2018-04-06 | Resolved: 2019-09-24

## 2020-01-08 ENCOUNTER — OFFICE VISIT (OUTPATIENT)
Dept: INTERNAL MEDICINE CLINIC | Age: 50
End: 2020-01-08

## 2020-01-08 VITALS
SYSTOLIC BLOOD PRESSURE: 160 MMHG | TEMPERATURE: 98.1 F | RESPIRATION RATE: 19 BRPM | HEIGHT: 68 IN | DIASTOLIC BLOOD PRESSURE: 100 MMHG | WEIGHT: 266.3 LBS | OXYGEN SATURATION: 98 % | BODY MASS INDEX: 40.36 KG/M2 | HEART RATE: 71 BPM

## 2020-01-08 DIAGNOSIS — I10 ESSENTIAL HYPERTENSION: Primary | ICD-10-CM

## 2020-01-08 PROBLEM — K57.90 DIVERTICULOSIS: Status: ACTIVE | Noted: 2020-01-08

## 2020-01-08 RX ORDER — LISINOPRIL 10 MG/1
10 TABLET ORAL DAILY
Qty: 30 TAB | Status: SHIPPED | OUTPATIENT
Start: 2020-01-08 | End: 2020-01-28 | Stop reason: ALTCHOICE

## 2020-01-08 RX ORDER — LANOLIN ALCOHOL/MO/W.PET/CERES
1 CREAM (GRAM) TOPICAL
COMMUNITY

## 2020-01-08 RX ORDER — ASCORBIC ACID 250 MG
TABLET ORAL
COMMUNITY

## 2020-01-08 NOTE — PROGRESS NOTES
Chief Complaint   Patient presents with    Hypertension     several elevated bp readings x several months     Visit Vitals  BP (!) 152/94 (BP 1 Location: Left arm, BP Patient Position: Sitting)   Pulse 71   Temp 98.1 °F (36.7 °C) (Oral)   Resp 19   Ht 5' 8\" (1.727 m)   Wt 266 lb 4.8 oz (120.8 kg)   LMP 12/23/2019   SpO2 98%   BMI 40.49 kg/m²     1. Have you been to the ER, urgent care clinic since your last visit? Hospitalized since your last visit? No    2. Have you seen or consulted any other health care providers outside of the [address] [place] [address] since your last visit? Include any pap smears or colon screening.  No

## 2020-01-08 NOTE — PROGRESS NOTES
Subjective:   Dano Keating is a 47 y.o. female      Chief Complaint   Patient presents with    Hypertension     several elevated bp readings x several months        History of present illness: She presents today for evaluation of elevated blood pressure on several readings is been present over the past several months. She has had a lot of stress going on at work and she has had a blood pressure checked several times and has been up on every occasion. She denies any headaches, nosebleeds. She does note that her menstrual periods become very irregular also which is felt to possibly be stress related although she tried birth control pills and that did not seem to regulate. She currently is on no birth control pills and her blood pressure is still elevated. She is concerned because her mother did have a stroke. She denies any other complaints. She does need a lot of salt in her diet but does not really get a lot of exercise and she does realize her weight is an issue.     Patient Active Problem List   Diagnosis Code    Morbid obesity ([facility] [address] E66.01    Incontinence R32    Joint pain M25.50    Gastroesophageal reflux disease without esophagitis K21.9    Indigestion K30    Edema of extremities R60.0    Primary osteoarthritis involving multiple joints M15.0    Acute laryngitis J04.0    Acute URI J06.9    Acute non-recurrent maxillary sinusitis J01.00    Mild intermittent asthma without complication F44.03    Left lower quadrant pain R10.32    Colitis K52.9    Acute bronchitis J20.9    Influenza J11.1    Dyslipidemia E78.5    Essential hypertension I10    Diverticulosis K57.90      Past Medical History:   Diagnosis Date    Asthma     Diverticulitis 09/2018    Edema of extremities 7/27/2010    Incontinence 7/27/2010    Indigestion 7/27/2010    Joint pain 7/27/2010    Morbid obesity ([facility] [address] 7/27/2010    Reflux 7/27/2010      No Known Allergies   Family History   Problem Relation Age of Onset  Hypertension Mother    [place] Other Mother 76        aneurysm in her head    High Cholesterol Father     Heart Disease Father       Social History     Socioeconomic History    Marital status:      Spouse name: Not on file    Number of children: Not on file    Years of education: Not on file    Highest education level: Not on file   Occupational History    Not on file   Social Needs    Financial resource strain: Not on file    Food insecurity:     Worry: Not on file     Inability: Not on file    Transportation needs:     Medical: Not on file     Non-medical: Not on file   Tobacco Use    Smoking status: Never Smoker    Smokeless tobacco: Never Used   Substance and Sexual Activity    Alcohol use: Yes     Comment: socially    Drug use: No    Sexual activity: Yes     Partners: Male     Birth control/protection: None   Lifestyle    Physical activity:     Days per week: Not on file     Minutes per session: Not on file    Stress: Not on file   Relationships    Social connections:     Talks on phone: Not on file     Gets together: Not on file     Attends Yazidi service: Not on file     Active member of club or organization: Not on file     Attends meetings of clubs or organizations: Not on file     Relationship status: Not on file    Intimate partner violence:     Fear of current or ex partner: Not on file     Emotionally abused: Not on file     Physically abused: Not on file     Forced sexual activity: Not on file   Other Topics Concern    Not on file   Social History Narrative    Not on file     Prior to Admission medications    Medication Sig Start Date End Date Taking? Authorizing Provider   ascorbic acid, vitamin C, (VITAMIN C) 250 mg tablet Take  by mouth. Yes Provider, Historical   calcium citrate-vitamin d3 (CITRACAL+D) 315-200 mg-unit tab Take 1 Tab by mouth daily (with breakfast). Yes Provider, Historical   lisinopril (PRINIVIL, ZESTRIL) 10 mg tablet Take 1 Tab by mouth daily. 1/8/20  Yes Madina Romero MD   folic acid (FOLVITE) 1 mg tablet folic acid   Yes Provider, Historical   clobetasol (TEMOVATE) 0.05 % topical cream APPLY EXTERNALLY TO THE AFFECTED AREA TWICE DAILY AS NEEDED 7/1/19  Yes Madina Romero MD   cetirizine (ZYRTEC) 10 mg tablet Take  by mouth. Yes Provider, Historical   PROAIR HFA 90 mcg/actuation inhaler TAKE 2 PUFFS BY MOUTH FOUR TIMES DAILY AS NEEDED 1/2/19  Yes Madina Romero MD   MULTIVITAMINS (MULTI-VITAMIN PO) Take  by mouth daily. 5/29/10  Yes Other, MD Codey        Review of Systems              Constitutional:  She denies fever, weight loss, sweats or fatigue. EYES: No blurred or double vision,               ENT: no nasal congestion, no headache or dizziness. No difficulty with               swallowing, taste, speech or smell. Respiratory:  No cough, wheezing or shortness of breath. No sputum production. Cardiac:  Denies chest pain, palpitations, unexplained indigestion, syncope, edema, PND or orthopnea. GI:  No changes in bowel movements, no abdominal pain, no bloating, anorexia, nausea, vomiting or heartburn. :  No frequency or dysuria. Denies incontinence or sexual dysfunction. Extremities:  No joint pain, stiffness or swelling  Back:.no pain or soreness  Skin:  No recent rashes or mole changes. Neurological:  No numbness, tingling, burning paresthesias or loss of motor strength. No syncope, dizziness, frequent headaches or memory loss. Hematologic:  No easy bruising  Lymphatic: No lymph node enlargement    Objective:     Vitals:    01/08/20 1522 01/08/20 1604   BP: (!) 152/94 (!) 160/100   Pulse: 71    Resp: 19    Temp: 98.1 °F (36.7 °C)    TempSrc: Oral    SpO2: 98%    Weight: 266 lb 4.8 oz (120.8 kg)    Height: 5' 8\" (1.727 m)    PainSc:   0 - No pain    LMP: 12/23/2019       Body mass index is 40.49 kg/m².    Physical Examination:              General Appearance:  Well-developed, well-nourished, no acute distress. HEENT:      Ears:  The TMs and ear canals were clear. Eyes:  The pupillary responses were normal.  Extraocular muscle function intact. Lids and conjunctiva not injected. Funduscopic exam revealed sharp disc margins. Nares: Clear w/o edema or erythema  Pharynx:  Clear with teeth in good repair. No masses were noted. Neck:  Supple without thyromegaly or adenopathy. No JVD noted. No carotid                bruits. Lungs:  Clear to auscultation and percussion. Cardiac:  Regular rate and rhythm without murmur. PMI not displaced. No gallop, rub or click. Abdominal: Soft, non-tender, no hepata-spleenomegally or masses  Extremities:  No clubbing, cyanosis or edema. Skin:  No rash or unusual mole changes noted. Lymph Nodes:  None felt in the cervical, supraclavicular, axillary or inguinal region. Neurological: . DTRs 2+ and symmetric. Station and gait normal.   Hematologic:   No purpura or petechiae        Assessment/Plan:         1. Essential hypertension        Impressions/Plan:  Impression  1. Hypertension blood pressure up several times per her checks at her office which is in the [facility] as well as blood pressure by the nurse here 152/94 and repeat by me 160/100 with a large cuff so I think this needs to be treated and will start lisinopril 10 mg daily and get her back in about 3 weeks. Recheck in about 3 weeks. I will see her sooner if there is a problem. Orders Placed This Encounter    ascorbic acid, vitamin C, (VITAMIN C) 250 mg tablet    calcium citrate-vitamin d3 (CITRACAL+D) 315-200 mg-unit tab    lisinopril (PRINIVIL, ZESTRIL) 10 mg tablet       Follow-up and Dispositions    · Return in about 3 weeks (around 1/29/2020). No results found for any visits on 01/08/20. Cherry Alvarado MD    The patient was given after the visit summary the patient verbalized an understanding of the plans and problems as explained.

## 2020-01-08 NOTE — PATIENT INSTRUCTIONS
Learning About ACE Inhibitors  Introduction    ACE (angiotensin-converting enzyme) inhibitors stop the release of an enzyme. This enzyme makes your blood vessels smaller. Without it, your blood vessels relax and get bigger. This lowers your blood pressure. These medicines also increase how much water and salt go into your urine. This also lowers blood pressure. You may take this kind of medicine if you have high blood pressure. Or you may take it if you have heart problems, kidney problems, or diabetes. If you have coronary artery disease, this medicine can help prevent heart attacks and strokes. Examples  · Benazepril (Lotensin)  · Lisinopril (Prinivil, Zestril)  · Ramipril (Altace)  This is not a complete list.  Possible side effects  Side effects may include:  · A cough. · Low blood pressure. This can make you feel dizzy or weak. · Too much potassium in your body. · Swelling. This may be a sign of an allergic reaction. You may have other side effects or reactions not listed here. Check the information that comes with your medicine. What to know about taking this medicine  · ACE inhibitors can cause a dry cough. Talk to your doctor if you have a dry cough. You may need a different medicine. · These medicines can cause an allergic reaction. This can cause a little swelling. Or it can cause red bumps on your skin that hurt. In rare cases, the swelling may make it hard for you to breathe. · Do not take this medicine if you are pregnant. And don't take it if you plan to become pregnant. · Take your medicines exactly as prescribed. Call your doctor if you think you are having a problem with your medicine. · Check with your doctor or pharmacist before you use any other medicines. This includes over-the-counter medicines. Make sure your doctor knows all of the medicines, vitamins, herbal products, and supplements you take. Taking some medicines together can cause problems.   · You may need regular blood tests.  Where can you learn more? Go to http://ravindra-bobby.info/. Enter P050 in the search box to learn more about \"Learning About ACE Inhibitors. \"  Current as of: April 9, 2019  Content Version: 12.2  © 1173-1631 [email or web address]. Care instructions adapted under license by [employer] (which disclaims liability or warranty for this information). If you have questions about a medical condition or this instruction, always ask your healthcare professional. [place] [address] any warranty or liability for your use of this information.

## 2020-01-13 ENCOUNTER — TELEPHONE (OUTPATIENT)
Dept: INTERNAL MEDICINE CLINIC | Age: 50
End: 2020-01-13

## 2020-01-13 NOTE — TELEPHONE ENCOUNTER
Patient states she was started on Lisinopril 10 mg daily about a week ago. This past weekend she felt alittle funny and checked her BP which was 91/63. Called Dr. Reynaldo Barillas on all and he advised her to hold her BP medication. Discussed with Dr. William Cerrato and advised the patient to restart the BP medication at 5 mg. Also advised to only take the BP medication if she has any unusual symptoms and f/up if any problems occur and keep her f/up appointment 1-.

## 2020-01-27 NOTE — PROGRESS NOTES
Chief Complaint   Patient presents with    Hypertension     3 week follow up       SUBJECTIVE:    Lavern Gillespie is a 47 y.o. female who returns in follow-up for hypertension, obesity and other medical problems. She has tolerated the lisinopril quite well except for she did develop a cough from it. She has a sensation of a tickly problem in the throat with resultant cough. She is not getting any sputum up. She notes no fevers or chills. She notes no shortness of breath. There are no other cardiorespiratory complaints other than the cough. Current Outpatient Medications   Medication Sig Dispense Refill    valsartan (DIOVAN) 80 mg tablet Take 1 Tab by mouth daily. 30 Tab prn    ascorbic acid, vitamin C, (VITAMIN C) 250 mg tablet Take  by mouth.  calcium citrate-vitamin d3 (CITRACAL+D) 315-200 mg-unit tab Take 1 Tab by mouth daily (with breakfast).  folic acid (FOLVITE) 1 mg tablet folic acid      clobetasol (TEMOVATE) 0.05 % topical cream APPLY EXTERNALLY TO THE AFFECTED AREA TWICE DAILY AS NEEDED 60 g 5    cetirizine (ZYRTEC) 10 mg tablet Take  by mouth.  PROAIR HFA 90 mcg/actuation inhaler TAKE 2 PUFFS BY MOUTH FOUR TIMES DAILY AS NEEDED 1 Inhaler 5    MULTIVITAMINS (MULTI-VITAMIN PO) Take  by mouth daily.        Past Medical History:   Diagnosis Date    Asthma     Diverticulitis 09/2018    Edema of extremities 7/27/2010    Incontinence 7/27/2010    Indigestion 7/27/2010    Joint pain 7/27/2010    Morbid obesity ([address] 7/27/2010    Reflux 7/27/2010     Past Surgical History:   Procedure Laterality Date    ABDOMEN SURGERY PROC UNLISTED      Lap band '08, hernia    HX ORTHOPAEDIC      trigger finger    LAP, PLACE ADJUST GASTR BAND  12/4/08     Allergies   Allergen Reactions    Lisinopril Cough       REVIEW OF SYSTEMS:  General: negative for - chills or fever, or weight loss or gain  ENT: negative for - headaches, nasal congestion or tinnitus  Eyes: no blurred or visual changes  Neck: No stiffness or swollen nodes  Respiratory: negative for - hemoptysis, shortness of breath or wheezing. Positive for tickly sensation in throat with a cough  Cardiovascular : negative for - chest pain, edema, palpitations or shortness of breath  Gastrointestinal: negative for - abdominal pain, blood in stools, heartburn or nausea/vomiting  Genito-Urinary: no dysuria, trouble voiding, or hematuria  Musculoskeletal: negative for - gait disturbance, joint pain, joint stiffness or joint swelling  Neurological: no TIA or stroke symptoms  Hematologic: no bruises, no bleeding  Lymphatic: no swollen glands  Integument: no lumps, mole changes, nail changes or rash  Endocrine:no malaise/lethargy poly uria or polydipsia or unexpected weight changes        Social History     Socioeconomic History    Marital status:      Spouse name: Not on file    Number of children: Not on file    Years of education: Not on file    Highest education level: Not on file   Tobacco Use    Smoking status: Never Smoker    Smokeless tobacco: Never Used   Substance and Sexual Activity    Alcohol use: Yes     Comment: socially    Drug use: No    Sexual activity: Yes     Partners: Male     Birth control/protection: None     Family History   Problem Relation Age of Onset    Hypertension Mother    Marla Ochoa Other Mother 76        aneurysm in her head    High Cholesterol Father     Heart Disease Father        OBJECTIVE:     Visit Vitals  /78 (BP 1 Location: Left arm, BP Patient Position: Sitting)   Pulse 71   Temp 98.3 °F (36.8 °C) (Oral)   Resp 19   Ht 5' 8\" (1.727 m)   Wt 265 lb 14.4 oz (120.6 kg)   SpO2 98%   BMI 40.43 kg/m²     CONSTITUTIONAL:   well nourished, appears age appropriate  EYES: sclera anicteric, PERRL, EOMI  ENMT:nares clear, moist mucous membranes, pharynx clear  NECK: supple.  Thyroid normal, No JVD or bruits  RESPIRATORY: Chest: clear to ascultation and percussion, normal inspiratory effort  CARDIOVASCULAR: Heart: regular rate and rhythm no murmurs, rubs or gallops, PMI not displaced, No thrills  GASTROINTESTINAL: Abdomen: non distended, soft, non tender, bowel sounds normal  HEMATOLOGIC: no purpura, petechiae or bruising  LYMPHATIC: No lymph node enlargemant  MUSCULOSKELETAL: Extremities: no edema or active synovitis, pulse 1+   INTEGUMENT: No unusual rashes or suspicious skin lesions noted. Nails appear normal.  PERIPHERAL VASCULAR: normal pulses femoral, PT and DP  NEUROLOGIC: non-focal exam, A & O X 3  PSYCHIATRIC:, appropriate affect     ASSESSMENT:   1. Essential hypertension    2. Morbid obesity ([facility] [address]      Impression  1. Hypertension that is controlled  2. Side effect of ACE inhibitor with cough so I will switch that from lisinopril to Diovan 80 mg daily. 3.  Obesity we did discuss diet, exercise and weight reduction for overall health benefit and note her weight is down 1 pound from last visit  Recheck in about a month and see how she is doing with the Diovan. I will see her sooner if there is a problem. PLAN:  .  Orders Placed This Encounter    valsartan (DIOVAN) 80 mg tablet         ATTENTION:   This medical record was transcribed using an electronic medical records system. Although proofread, it may and can contain electronic and spelling errors. Other human spelling and other errors may be present. Corrections may be executed at a later time. Please feel free to contact us for any clarifications as needed. Follow-up and Dispositions    · Return in about 4 weeks (around 2/25/2020). No results found for any visits on 01/28/20. Edi Brito MD    The patient verbalized understanding of the problems and plans as explained.

## 2020-01-28 ENCOUNTER — OFFICE VISIT (OUTPATIENT)
Dept: INTERNAL MEDICINE CLINIC | Age: 50
End: 2020-01-28

## 2020-01-28 VITALS
TEMPERATURE: 98.3 F | SYSTOLIC BLOOD PRESSURE: 128 MMHG | WEIGHT: 265.9 LBS | DIASTOLIC BLOOD PRESSURE: 78 MMHG | RESPIRATION RATE: 19 BRPM | HEART RATE: 71 BPM | HEIGHT: 68 IN | OXYGEN SATURATION: 98 % | BODY MASS INDEX: 40.3 KG/M2

## 2020-01-28 DIAGNOSIS — E66.01 MORBID OBESITY (HCC): ICD-10-CM

## 2020-01-28 DIAGNOSIS — I10 ESSENTIAL HYPERTENSION: Primary | ICD-10-CM

## 2020-01-28 RX ORDER — VALSARTAN 80 MG/1
80 TABLET ORAL DAILY
Qty: 30 TAB | Status: SHIPPED | OUTPATIENT
Start: 2020-01-28 | End: 2020-03-16 | Stop reason: SDUPTHER

## 2020-01-28 NOTE — PROGRESS NOTES
Chief Complaint   Patient presents with    Hypertension     3 week follow up     Visit Vitals  /78 (BP 1 Location: Left arm, BP Patient Position: Sitting)   Pulse 71   Temp 98.3 °F (36.8 °C) (Oral)   Resp 19   Ht 5' 8\" (1.727 m)   Wt 265 lb 14.4 oz (120.6 kg)   SpO2 98%   BMI 40.43 kg/m²     1. Have you been to the ER, urgent care clinic since your last visit? Hospitalized since your last visit?no  2. Have you seen or consulted any other health care providers outside of the [address] [place] [address] since your last visit? Include any pap smears or colon screening.  No

## 2020-01-28 NOTE — PATIENT INSTRUCTIONS
Body Mass Index: Care Instructions  Your Care Instructions    Body mass index (BMI) can help you see if your weight is raising your risk for health problems. It uses a formula to compare how much you weigh with how tall you are. · A BMI lower than 18.5 is considered underweight. · A BMI between 18.5 and 24.9 is considered healthy. · A BMI between 25 and 29.9 is considered overweight. A BMI of 30 or higher is considered obese. If your BMI is in the normal range, it means that you have a lower risk for weight-related health problems. If your BMI is in the overweight or obese range, you may be at increased risk for weight-related health problems, such as high blood pressure, heart disease, stroke, arthritis or joint pain, and diabetes. If your BMI is in the underweight range, you may be at increased risk for health problems such as fatigue, lower protection (immunity) against illness, muscle loss, bone loss, hair loss, and hormone problems. BMI is just one measure of your risk for weight-related health problems. You may be at higher risk for health problems if you are not active, you eat an unhealthy diet, or you drink too much alcohol or use tobacco products. Follow-up care is a key part of your treatment and safety. Be sure to make and go to all appointments, and call your doctor if you are having problems. It's also a good idea to know your test results and keep a list of the medicines you take. How can you care for yourself at home? · Practice healthy eating habits. This includes eating plenty of fruits, vegetables, whole grains, lean protein, and low-fat dairy. · If your doctor recommends it, get more exercise. Walking is a good choice. Bit by bit, increase the amount you walk every day. Try for at least 30 minutes on most days of the week. · Do not smoke. Smoking can increase your risk for health problems. If you need help quitting, talk to your doctor about stop-smoking programs and medicines. These can increase your chances of quitting for good. · Limit alcohol to 2 drinks a day for men and 1 drink a day for women. Too much alcohol can cause health problems. If you have a BMI higher than 25  · Your doctor may do other tests to check your risk for weight-related health problems. This may include measuring the distance around your waist. A waist measurement of more than 40 inches in men or 35 inches in women can increase the risk of weight-related health problems. · Talk with your doctor about steps you can take to stay healthy or improve your health. You may need to make lifestyle changes to lose weight and stay healthy, such as changing your diet and getting regular exercise. If you have a BMI lower than 18.5  · Your doctor may do other tests to check your risk for health problems. · Talk with your doctor about steps you can take to stay healthy or improve your health. You may need to make lifestyle changes to gain or maintain weight and stay healthy, such as getting more healthy foods in your diet and doing exercises to build muscle. Where can you learn more? Go to http://ravindra-bobby.info/. Enter S176 in the search box to learn more about \"Body Mass Index: Care Instructions. \"  Current as of: March 28, 2019  Content Version: 12.2  © 6328-5397 [employer]. Care instructions adapted under license by [employer] (which disclaims liability or warranty for this information). If you have questions about a medical condition or this instruction, always ask your healthcare professional. [address] any warranty or liability for your use of this information.

## 2020-03-01 PROBLEM — R05.8 COUGH DUE TO ACE INHIBITOR: Status: ACTIVE | Noted: 2020-03-01

## 2020-03-01 PROBLEM — T46.4X5A COUGH DUE TO ACE INHIBITOR: Status: ACTIVE | Noted: 2020-03-01

## 2020-03-01 PROBLEM — J06.9 ACUTE URI: Status: RESOLVED | Noted: 2017-11-30 | Resolved: 2020-03-01

## 2020-03-01 PROBLEM — J04.0 ACUTE LARYNGITIS: Status: RESOLVED | Noted: 2017-07-24 | Resolved: 2020-03-01

## 2020-03-02 ENCOUNTER — OFFICE VISIT (OUTPATIENT)
Dept: INTERNAL MEDICINE CLINIC | Age: 50
End: 2020-03-02

## 2020-03-02 VITALS
BODY MASS INDEX: 40.62 KG/M2 | DIASTOLIC BLOOD PRESSURE: 88 MMHG | TEMPERATURE: 98.4 F | WEIGHT: 268 LBS | SYSTOLIC BLOOD PRESSURE: 110 MMHG | RESPIRATION RATE: 18 BRPM | HEIGHT: 68 IN | HEART RATE: 72 BPM | OXYGEN SATURATION: 97 %

## 2020-03-02 DIAGNOSIS — E66.01 MORBID OBESITY (HCC): ICD-10-CM

## 2020-03-02 DIAGNOSIS — R05.8 COUGH DUE TO ACE INHIBITOR: ICD-10-CM

## 2020-03-02 DIAGNOSIS — T46.4X5A COUGH DUE TO ACE INHIBITOR: ICD-10-CM

## 2020-03-02 DIAGNOSIS — I10 ESSENTIAL HYPERTENSION: Primary | ICD-10-CM

## 2020-03-02 RX ORDER — CLOBETASOL PROPIONATE 0.5 MG/G
CREAM TOPICAL
Qty: 45 G | Refills: 0 | Status: SHIPPED | OUTPATIENT
Start: 2020-03-02

## 2020-03-02 NOTE — PATIENT INSTRUCTIONS
Arthritis: Care Instructions  Your Care Instructions  Arthritis, also called osteoarthritis, is a breakdown of the cartilage that cushions your joints. When the cartilage wears down, your bones rub against each other. This causes pain and stiffness. Many people have some arthritis as they age. Arthritis most often affects the joints of the spine, hands, hips, knees, or feet. You can take simple measures to protect your joints, ease your pain, and help you stay active. Follow-up care is a key part of your treatment and safety. Be sure to make and go to all appointments, and call your doctor if you are having problems. It's also a good idea to know your test results and keep a list of the medicines you take. How can you care for yourself at home? · Stay at a healthy weight. Being overweight puts extra strain on your joints. · Talk to your doctor or physical therapist about exercises that will help ease joint pain. ? Stretch. You may enjoy gentle forms of yoga to help keep your joints and muscles flexible. ? Walk instead of jog. Other types of exercise that are less stressful on the joints include riding a bicycle, swimming, pauline chi, or water exercise. ? Lift weights. Strong muscles help reduce stress on your joints. Stronger thigh muscles, for example, take some of the stress off of the knees and hips. Learn the right way to lift weights so you do not make joint pain worse. · Take your medicines exactly as prescribed. Call your doctor if you think you are having a problem with your medicine. · Take pain medicines exactly as directed. ? If the doctor gave you a prescription medicine for pain, take it as prescribed. ? If you are not taking a prescription pain medicine, ask your doctor if you can take an over-the-counter medicine. · Use a cane, crutch, walker, or another device if you need help to get around. These can help rest your joints.  You also can use other things to make life easier, such as a higher toilet seat and padded handles on kitchen utensils. · Do not sit in low chairs, which can make it hard to get up. · Put heat or cold on your sore joints as needed. Use whichever helps you most. You also can take turns with hot and cold packs. ? Apply heat 2 or 3 times a day for 20 to 30 minutes--using a heating pad, hot shower, or hot pack--to relieve pain and stiffness. ? Put ice or a cold pack on your sore joint for 10 to 20 minutes at a time. Put a thin cloth between the ice and your skin. When should you call for help? Call your doctor now or seek immediate medical care if:    · You have sudden swelling, warmth, or pain in any joint.     · You have joint pain and a fever or rash.     · You have such bad pain that you cannot use a joint.    Watch closely for changes in your health, and be sure to contact your doctor if:    · You have mild joint symptoms that continue even with more than 6 weeks of care at home.     · You have stomach pain or other problems with your medicine. Where can you learn more? Go to http://ravindra-bobby.info/. Enter P098 in the search box to learn more about \"Arthritis: Care Instructions. \"  Current as of: April 1, 2019  Content Version: 12.2  © 3210-9282 [employer]. Care instructions adapted under license by [employer] (which disclaims liability or warranty for this information). If you have questions about a medical condition or this instruction, always ask your healthcare professional. [place] [address] any warranty or liability for your use of this information.

## 2020-03-02 NOTE — PROGRESS NOTES
Chief Complaint   Patient presents with    Hypertension     1 month follow up     Visit Vitals  /88 (BP 1 Location: Left arm, BP Patient Position: Sitting)   Pulse 72   Temp 98.4 °F (36.9 °C) (Oral)   Resp 18   Ht 5' 8\" (1.727 m)   Wt 268 lb (121.6 kg)   SpO2 97%   BMI 40.75 kg/m²     1. Have you been to the ER, urgent care clinic since your last visit? Hospitalized since your last visit? No    2. Have you seen or consulted any other health care providers outside of the [address] [place] [address] since your last visit? Include any pap smears or colon screening.  No

## 2020-03-02 NOTE — PROGRESS NOTES
Chief Complaint   Patient presents with    Hypertension     1 month follow up       SUBJECTIVE:    Vadim Pizarro is a 47 y.o. female who returns in follow-up for her hypertension after recently being seen here with a significant cough lisinopril when she was switched from lisinopril to Diovan 80 mg which she has been tolerating quite well. She notes no lightheadedness or dizziness with the change. She denies any significant cough or other cardiorespiratory complaints. She has no neurologic complaints. There are no other complaints on complete review of systems. She has checking her blood pressure several times at work and has been running normal I reviewed those numbers today. Current Outpatient Medications   Medication Sig Dispense Refill    clobetasoL (TEMOVATE) 0.05 % topical cream APPLY EXTERNALLY TO THE AFFECTED AREA TWICE DAILY AS NEEDED 45 g 0    valsartan (DIOVAN) 80 mg tablet Take 1 Tab by mouth daily. 30 Tab prn    ascorbic acid, vitamin C, (VITAMIN C) 250 mg tablet Take  by mouth.  calcium citrate-vitamin d3 (CITRACAL+D) 315-200 mg-unit tab Take 1 Tab by mouth daily (with breakfast).  folic acid (FOLVITE) 1 mg tablet folic acid      cetirizine (ZYRTEC) 10 mg tablet Take  by mouth.  PROAIR HFA 90 mcg/actuation inhaler TAKE 2 PUFFS BY MOUTH FOUR TIMES DAILY AS NEEDED 1 Inhaler 5    MULTIVITAMINS (MULTI-VITAMIN PO) Take  by mouth daily.        Past Medical History:   Diagnosis Date    Asthma     Diverticulitis 09/2018    Edema of extremities 7/27/2010    Incontinence 7/27/2010    Indigestion 7/27/2010    Joint pain 7/27/2010    Morbid obesity ([address] 7/27/2010    Reflux 7/27/2010     Past Surgical History:   Procedure Laterality Date    ABDOMEN SURGERY PROC UNLISTED      Lap band '08, hernia    HX ORTHOPAEDIC      trigger finger    LAP, PLACE ADJUST GASTR BAND  12/4/08     Allergies   Allergen Reactions    Lisinopril Cough       REVIEW OF SYSTEMS:  General: negative for - chills or fever, or weight loss or gain  ENT: negative for - headaches, nasal congestion or tinnitus  Eyes: no blurred or visual changes  Neck: No stiffness or swollen nodes  Respiratory: negative for - cough, hemoptysis, shortness of breath or wheezing  Cardiovascular : negative for - chest pain, edema, palpitations or shortness of breath  Gastrointestinal: negative for - abdominal pain, blood in stools, heartburn or nausea/vomiting  Genito-Urinary: no dysuria, trouble voiding, or hematuria  Musculoskeletal: negative for - gait disturbance, joint pain, joint stiffness or joint swelling  Neurological: no TIA or stroke symptoms  Hematologic: no bruises, no bleeding  Lymphatic: no swollen glands  Integument: no lumps, mole changes, nail changes or rash  Endocrine:no malaise/lethargy poly uria or polydipsia or unexpected weight changes        Social History     Socioeconomic History    Marital status:      Spouse name: Not on file    Number of children: Not on file    Years of education: Not on file    Highest education level: Not on file   Tobacco Use    Smoking status: Never Smoker    Smokeless tobacco: Never Used   Substance and Sexual Activity    Alcohol use: Yes     Comment: socially    Drug use: No    Sexual activity: Yes     Partners: Male     Birth control/protection: None     Family History   Problem Relation Age of Onset    Hypertension Mother    [address] [facility] [address] Other Mother 76        aneurysm in her head    High Cholesterol Father     Heart Disease Father        OBJECTIVE:     Visit Vitals  /88 (BP 1 Location: Left arm, BP Patient Position: Sitting)   Pulse 72   Temp 98.4 °F (36.9 °C) (Oral)   Resp 18   Ht 5' 8\" (1.727 m)   Wt 268 lb (121.6 kg)   SpO2 97%   BMI 40.75 kg/m²     CONSTITUTIONAL:   well nourished, appears age appropriate  EYES: sclera anicteric, PERRL, EOMI  ENMT:nares clear, moist mucous membranes, pharynx clear  NECK: supple.  Thyroid normal, No JVD or bruits  RESPIRATORY: Chest: clear to ascultation and percussion, normal inspiratory effort  CARDIOVASCULAR: Heart: regular rate and rhythm no murmurs, rubs or gallops, PMI not displaced, No thrills  GASTROINTESTINAL: Abdomen: non distended, soft, non tender, bowel sounds normal  HEMATOLOGIC: no purpura, petechiae or bruising  LYMPHATIC: No lymph node enlargemant  MUSCULOSKELETAL: Extremities: no edema or active synovitis, pulse 1+   INTEGUMENT: No unusual rashes or suspicious skin lesions noted. Nails appear normal.  PERIPHERAL VASCULAR: normal pulses femoral, PT and DP  NEUROLOGIC: non-focal exam, A & O X 3  PSYCHIATRIC:, appropriate affect     ASSESSMENT:   1. Essential hypertension    2. Cough due to ACE inhibitor    3. Morbid obesity ([address]      Impression  1. Hypertension that is well controlled so continue current therapy reviewed with her. 2.  Cough from ACE inhibitor resolved off of ACE  3. Morbid obesity we did again discuss diet, exercise and weight reduction  Recheck as previously scheduled. PLAN:  . No orders of the defined types were placed in this encounter. ATTENTION:   This medical record was transcribed using an electronic medical records system. Although proofread, it may and can contain electronic and spelling errors. Other human spelling and other errors may be present. Corrections may be executed at a later time. Please feel free to contact us for any clarifications as needed. No results found for any visits on 03/02/20. Brooklynn Arteaga MD    The patient verbalized understanding of the problems and plans as explained.

## 2020-03-16 RX ORDER — VALSARTAN 80 MG/1
80 TABLET ORAL DAILY
Qty: 90 TAB | Refills: 0 | Status: SHIPPED | OUTPATIENT
Start: 2020-03-16

## 2020-03-16 NOTE — TELEPHONE ENCOUNTER
PCP: Kiera Bean MD    Last appt: 3/2/2020  Future Appointments   Date Time Provider Emanuel Pimentel   5/8/2020  8:00 AM Kiera Bean MD [facility] SCHED       Requested Prescriptions     Pending Prescriptions Disp Refills    valsartan (DIOVAN) 80 mg tablet 90 Tab 0     Sig: Take 1 Tab by mouth daily.

## 2020-05-08 ENCOUNTER — VIRTUAL VISIT (OUTPATIENT)
Dept: INTERNAL MEDICINE CLINIC | Age: 50
End: 2020-05-08

## 2020-05-08 VITALS — DIASTOLIC BLOOD PRESSURE: 69 MMHG | WEIGHT: 269 LBS | BODY MASS INDEX: 40.9 KG/M2 | SYSTOLIC BLOOD PRESSURE: 102 MMHG

## 2020-05-08 DIAGNOSIS — E66.01 MORBID OBESITY (HCC): ICD-10-CM

## 2020-05-08 DIAGNOSIS — K21.9 GASTROESOPHAGEAL REFLUX DISEASE WITHOUT ESOPHAGITIS: ICD-10-CM

## 2020-05-08 DIAGNOSIS — M15.9 PRIMARY OSTEOARTHRITIS INVOLVING MULTIPLE JOINTS: ICD-10-CM

## 2020-05-08 DIAGNOSIS — E78.5 DYSLIPIDEMIA: ICD-10-CM

## 2020-05-08 DIAGNOSIS — J45.20 MILD INTERMITTENT ASTHMA WITHOUT COMPLICATION: ICD-10-CM

## 2020-05-08 DIAGNOSIS — I10 ESSENTIAL HYPERTENSION: Primary | ICD-10-CM

## 2020-05-08 NOTE — PROGRESS NOTES
Chief Complaint   Patient presents with    Hypertension     f/up     1. Have you been to the ER, urgent care clinic since your last visit? Hospitalized since your last visit? Saw Rheumatologist    2. Have you seen or consulted any other health care providers outside of the [address] [place] [address] since your last visit? Include any pap smears or colon screening.  No

## 2020-05-08 NOTE — PROGRESS NOTES
Consent: Jaime Spencer, who was seen by synchronous (real-time) audio-video technology, and/or her healthcare decision maker, is aware that this patient-initiated, Telehealth encounter on 5/8/2020 is a billable service, with coverage as determined by her insurance carrier. She is aware that she may receive a bill and has provided verbal consent to proceed: Yes. Assessment & Plan:   Diagnoses and all orders for this visit:    1. Essential hypertension    2. Dyslipidemia    3. Gastroesophageal reflux disease without esophagitis    4. Mild intermittent asthma without complication    5. Morbid obesity ([address]    6. Primary osteoarthritis involving multiple joints          Follow-up and Dispositions    · Return in about 3 months (around 8/8/2020). I spent at least 25 minutes with this established patient, and >50% of the time was spent counseling and/or coordinating care regarding Hypertension, dyslipidemia, GERD, obesity, DJD and other medical problems. 712  Subjective:   Jaime Spencer is a 47 y.o. female who was seen for Hypertension (f/up)      Prior to Admission medications    Medication Sig Start Date End Date Taking? Authorizing Provider   valsartan (DIOVAN) 80 mg tablet Take 1 Tab by mouth daily. 3/16/20  Yes Atul Bazzi MD   clobetasoL (TEMOVATE) 0.05 % topical cream APPLY EXTERNALLY TO THE AFFECTED AREA TWICE DAILY AS NEEDED 3/2/20  Yes Atul Bazzi MD   ascorbic acid, vitamin C, (VITAMIN C) 250 mg tablet Take  by mouth. Yes Provider, Historical   calcium citrate-vitamin d3 (CITRACAL+D) 315-200 mg-unit tab Take 1 Tab by mouth daily (with breakfast). Yes Provider, Historical   cetirizine (ZYRTEC) 10 mg tablet Take  by mouth. Yes Provider, Historical   PROAIR HFA 90 mcg/actuation inhaler TAKE 2 PUFFS BY MOUTH FOUR TIMES DAILY AS NEEDED 1/2/19  Yes Atul Bazzi MD   MULTIVITAMINS (MULTI-VITAMIN PO) Take  by mouth daily.  5/29/10  Yes Kelle, MD Codey   folic acid (FOLVITE) 1 mg tablet folic acid    Provider, Historical     Allergies   Allergen Reactions    Lisinopril Cough       Patient Active Problem List   Diagnosis Code    Morbid obesity ([facility] E66.01    Incontinence R32    Joint pain M25.50    Gastroesophageal reflux disease without esophagitis K21.9    Indigestion K30    Edema of extremities R60.0    Primary osteoarthritis involving multiple joints M15.0    Acute non-recurrent maxillary sinusitis J01.00    Mild intermittent asthma without complication J03.50    Annual physical exam Z00.00    Left lower quadrant pain R10.32    Colitis K52.9    Acute bronchitis J20.9    Influenza J11.1    Dyslipidemia E78.5    Essential hypertension I10    Diverticulosis K57.90    Cough due to ACE inhibitor R05, T46.4X5A     Patient Active Problem List    Diagnosis Date Noted    Essential hypertension 01/08/2020     Priority: 1 - One    Dyslipidemia 05/02/2019     Priority: 1 - One    Mild intermittent asthma without complication 79/45/4282     Priority: 1 - One    Primary osteoarthritis involving multiple joints 07/24/2017     Priority: 1 - One    Morbid obesity ([facility] 07/27/2010     Priority: 1 - One    Gastroesophageal reflux disease without esophagitis 07/27/2010     Priority: 1 - One    Cough due to ACE inhibitor 03/01/2020    Diverticulosis 01/08/2020    Acute bronchitis 03/11/2019    Influenza 03/11/2019    Left lower quadrant pain 07/13/2018    Colitis 07/13/2018    Annual physical exam 04/06/2018    Acute non-recurrent maxillary sinusitis 03/05/2018    Incontinence 07/27/2010    Joint pain 07/27/2010    Indigestion 07/27/2010    Edema of extremities 07/27/2010     Current Outpatient Medications   Medication Sig Dispense Refill    valsartan (DIOVAN) 80 mg tablet Take 1 Tab by mouth daily.  90 Tab 0    clobetasoL (TEMOVATE) 0.05 % topical cream APPLY EXTERNALLY TO THE AFFECTED AREA TWICE DAILY AS NEEDED 45 g 0    ascorbic acid, vitamin C, (VITAMIN C) 250 mg tablet Take  by mouth.  calcium citrate-vitamin d3 (CITRACAL+D) 315-200 mg-unit tab Take 1 Tab by mouth daily (with breakfast).  cetirizine (ZYRTEC) 10 mg tablet Take  by mouth.  PROAIR HFA 90 mcg/actuation inhaler TAKE 2 PUFFS BY MOUTH FOUR TIMES DAILY AS NEEDED 1 Inhaler 5    MULTIVITAMINS (MULTI-VITAMIN PO) Take  by mouth daily.  folic acid (FOLVITE) 1 mg tablet folic acid       Allergies   Allergen Reactions    Lisinopril Cough     Past Medical History:   Diagnosis Date    Asthma     Diverticulitis 09/2018    Edema of extremities 7/27/2010    Incontinence 7/27/2010    Indigestion 7/27/2010    Joint pain 7/27/2010    Morbid obesity ([address] 7/27/2010    Reflux 7/27/2010     Past Surgical History:   Procedure Laterality Date    ABDOMEN SURGERY PROC UNLISTED      Lap band '08, hernia    HX ORTHOPAEDIC      trigger finger    LAP, PLACE ADJUST GASTR BAND  12/4/08     Family History   Problem Relation Age of Onset    Hypertension Mother    [facility] Other Mother 76        aneurysm in her head    High Cholesterol Father     Heart Disease Father      Social History     Tobacco Use    Smoking status: Never Smoker    Smokeless tobacco: Never Used   Substance Use Topics    Alcohol use: Yes     Comment: socially       Review of Systems      General: Not Present- Anorexia, Chills, Dietary,Fatigue, Fever, Medication Changes, Night Sweats, Weight Gain > 10lbs. and Weight Loss > 10lbs. .  Skin: Not Present- Bruising and Excessive Sweating. HEENT: Not Present- Headache, Visual Loss and Vertigo.   Respiratory: Not Present- Cough, shortness of breath or dyspnea on exertion  Cardiovascular: Not Present- Chest Pain, Difficulty Breathing On Exertion, Edema, Orthopnea, Palpitations, Paroxysmal Nocturnal Dyspnea,  Shortness of Breath   Gastrointestinal: Not Present- Black, Tarry Stool, Bloody Stool, Diarrhea, abdominal pain or nausea vomiting  Genitourinary: Not presenturinary frequency, dysuria or other urinary symptoms  Musculoskeletal: Not Present-joint pain, joint swelling, muscle Pain or Muscle Weakness. Neurological: Not Present- Dizziness, new headaches or TIA symptoms  Psychiatric: Not Present- Depression or anxiety  Endocrine: Not Present- Cold Intolerance, Heat Intolerance and Thyroid Problems. Hematology: Not Present- Abnormal Bleeding or Easy Bruising. Objective:   Vital Signs: (As obtained by patient/caregiver at home)  Visit Vitals  /69   Wt 269 lb (122 kg)   BMI 40.90 kg/m²            Constitutional: [x] Appears well-developed and well-nourished [x] No apparent distress          Mental status: [x] Alert and awake  [x] Oriented to person/place/time [x] Able to follow commands        Eyes:   EOM    [x]  Normal      Sclera  [x]  Normal              Discharge [x]  None visible      HENT: [x] Normocephalic, atraumatic   [x] Mouth/Throat: Mucous membranes are moist    External Ears [x] Normal     Neck: [x] No visualized mass    Pulmonary/Chest: [x] Respiratory effort normal   [x] No visualized signs of difficulty breathing or respiratory distress             Musculoskeletal:   [x] Normal gait with no signs of ataxia         [x] Normal range of motion of neck           Neurological:        [x] No Facial Asymmetry (Cranial nerve 7 motor function) (limited exam due to video visit)          [x] No gaze palsy                Skin:        [x] No significant exanthematous lesions or discoloration noted on facial skin                    Psychiatric:       [x] Normal Affect       [x] No Hallucinations    Other pertinent observable physical exam findings:-        We discussed the expected course, resolution and complications of the diagnosis(es) in detail. Medication risks, benefits, costs, interactions, and alternatives were discussed as indicated. I advised her to contact the office if her condition worsens, changes or fails to improve as anticipated.  She expressed understanding with the diagnosis(es) and plan. ASSESSMENT:   1. Essential hypertension    2. Dyslipidemia    3. Gastroesophageal reflux disease without esophagitis    4. Mild intermittent asthma without complication    5. Morbid obesity ([address]    6. Primary osteoarthritis involving multiple joints      Impression  1. Hypertension that blood pressures controlled by her checks at home and she is having no symptoms and cough is resolved with the change to Diovan from lisinopril  2. Dyslipidemia reviewed last numbers but unfortunately cannot check virtually today we will continue diet discussed  3. GERD that is stable  4. Asthma currently stable  5. Obesity we did discuss diet, exercise and weight reduction  6. DJD that is stable  We will set her up for follow-up in 3 months here at which time she will come in for her annual checkup. I will see her sooner if other problems arise. PLAN:  . No orders of the defined types were placed in this encounter. ATTENTION:   This medical record was transcribed using an electronic medical records system. Although proofread, it may and can contain electronic and spelling errors. Other human spelling and other errors may be present. Corrections may be executed at a later time. Please feel free to contact us for any clarifications as needed. Follow-up and Dispositions    · Return in about 3 months (around 8/8/2020). Penny Wang MD    Elige Gosselin is a 47 y.o. female being evaluated by a video visit encounter for concerns as above. A caregiver was present when appropriate. Due to this being a TeleHealth encounter (During NCDLN-24 public health emergency), evaluation of the following organ systems was limited: Vitals/Constitutional/EENT/Resp/CV/GI//MS/Neuro/Skin/Heme-Lymph-Imm.   Pursuant to the emergency declaration under the [address] [place] waiver authority and the [employer] Supplemental Appropriations Act, this Virtual  Visit was conducted, with patient's (and/or legal guardian's) consent, to reduce the patient's risk of exposure to COVID-19 and provide necessary medical care. Services were provided through a video synchronous discussion virtually to substitute for in-person clinic visit. Patient and provider were located at their individual homes.         Kerrie Pisano MD

## 2020-06-06 PROBLEM — Z00.00 ANNUAL PHYSICAL EXAM: Status: RESOLVED | Noted: 2018-04-06 | Resolved: 2020-06-06

## 2020-08-27 ENCOUNTER — TELEPHONE (OUTPATIENT)
Dept: INTERNAL MEDICINE CLINIC | Age: 50
End: 2020-08-27

## 2020-08-27 NOTE — TELEPHONE ENCOUNTER
Attempted to call pt on cell phone-voicemail was full  Left message on work voicemail for patient to return call

## 2020-08-27 NOTE — TELEPHONE ENCOUNTER
Patient would like to know the dates of her TB and Heb B vaccinations.     Laura Cancer [phone]

## 2021-03-18 ENCOUNTER — OFFICE VISIT (OUTPATIENT)
Dept: URGENT CARE | Age: 51
End: 2021-03-18

## 2021-03-18 VITALS — OXYGEN SATURATION: 98 % | HEART RATE: 60 BPM | RESPIRATION RATE: 17 BRPM | TEMPERATURE: 98.4 F

## 2021-03-18 DIAGNOSIS — R09.82 POST-NASAL DRIP: ICD-10-CM

## 2021-03-18 DIAGNOSIS — Z11.59 SCREENING FOR VIRAL DISEASE: ICD-10-CM

## 2021-03-18 DIAGNOSIS — R05.9 COUGH: Primary | ICD-10-CM

## 2021-03-18 LAB — SARS-COV-2 POC: NEGATIVE

## 2021-03-18 PROCEDURE — 99203 OFFICE O/P NEW LOW 30 MIN: CPT | Performed by: FAMILY MEDICINE

## 2021-03-18 PROCEDURE — 87426 SARSCOV CORONAVIRUS AG IA: CPT | Performed by: FAMILY MEDICINE

## 2021-03-18 NOTE — PROGRESS NOTES
This patient was seen at [address] [place] [address] Urgent Care while in their vehicle due to COVID-19 pandemic with PPE and focused examination in order to decrease community viral transmission. The patient/guardian gave verbal consent to treat. Jeannette Troy is a 45 y.o. female who presents with cough and postnasal drip that started last night. No known COVID-19 contacts, but daughter with cold like sx that started last week and daughter tested negative to COVID-19. Denies fever, SOB, N/V/D. The history is provided by the patient.         Past Medical History:   Diagnosis Date    Asthma     Diverticulitis 09/2018    Edema of extremities 7/27/2010    Incontinence 7/27/2010    Indigestion 7/27/2010    Joint pain 7/27/2010    Morbid obesity ([address] 7/27/2010    Reflux 7/27/2010        Past Surgical History:   Procedure Laterality Date    HX ORTHOPAEDIC      trigger finger    [place] ABDOMEN SURGERY PROC UNLISTED      Lap band '08, hernia    [place] LAP, PLACE ADJUST GASTR BAND  12/4/08         Family History   Problem Relation Age of Onset    Hypertension Mother    Floydene Ada Other Mother 76        aneurysm in her head    High Cholesterol Father     Heart Disease Father         Social History     Socioeconomic History    Marital status:      Spouse name: Not on file    Number of children: Not on file    Years of education: Not on file    Highest education level: Not on file   Occupational History    Not on file   Social Needs    Financial resource strain: Not on file    Food insecurity     Worry: Not on file     Inability: Not on file    Transportation needs     Medical: Not on file     Non-medical: Not on file   Tobacco Use    Smoking status: Never Smoker    Smokeless tobacco: Never Used   Substance and Sexual Activity    Alcohol use: Yes     Comment: socially    Drug use: No    Sexual activity: Yes     Partners: Male     Birth control/protection: None   Lifestyle    Physical activity     Days per week: Not on file     Minutes per session: Not on file    Stress: Not on file   Relationships    Social connections     Talks on phone: Not on file     Gets together: Not on file     Attends Latter day service: Not on file     Active member of club or organization: Not on file     Attends meetings of clubs or organizations: Not on file     Relationship status: Not on file    Intimate partner violence     Fear of current or ex partner: Not on file     Emotionally abused: Not on file     Physically abused: Not on file     Forced sexual activity: Not on file   Other Topics Concern    Not on file   Social History Narrative    Not on file                ALLERGIES: Lisinopril    Review of Systems   Constitutional: Negative for activity change, appetite change and fever. HENT: Positive for postnasal drip. Respiratory: Positive for cough. Negative for shortness of breath. Vitals:    03/18/21 1742   Pulse: 60   Resp: 17   Temp: 98.4 °F (36.9 °C)   SpO2: 98%       Physical Exam  Vitals signs and nursing note reviewed. Constitutional:       General: She is not in acute distress. Appearance: She is well-developed. She is not diaphoretic. Pulmonary:      Effort: Pulmonary effort is normal. No respiratory distress. Breath sounds: Normal breath sounds. No stridor. No wheezing, rhonchi or rales. Neurological:      Mental Status: She is alert. Psychiatric:         Behavior: Behavior normal.         Thought Content: Thought content normal.         Judgment: Judgment normal.         MDM    ICD-10-CM ICD-9-CM   1. Cough  R05 786.2   2. Post-nasal drip  R09.82 784.91   3.  Screening for viral disease  Z11.59 V73.99       Orders Placed This Encounter    NOVEL CORONAVIRUS (COVID-19)     Scheduling Instructions:      1) Due to current limited availability of the COVID-19 PCR test, tests will be prioritized and may not be completed.              2) Order only if the test result will change clinical management or necessary for a return to mission-critical employment decision.              3) Print and instruct patient to adhere to CDC home isolation program. (Link Above)              4) Set up or refer patient for a monitoring program.              5) Have patient sign up for and leverage MyChart (if not previously done). Order Specific Question:   Is this test for diagnosis or screening? Answer:   Diagnosis of ill patient     Order Specific Question:   Symptomatic for COVID-19 as defined by CDC? Answer:   Yes     Order Specific Question:   Date of Symptom Onset     Answer:   3/17/2021     Order Specific Question:   Hospitalized for COVID-19? Answer:   No     Order Specific Question:   Admitted to ICU for COVID-19? 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 Answer:   No     Order Specific Question:   Pregnant? Answer:   No     Order Specific Question:   Previously tested for COVID-19? Answer: Yes    AMB POC SARS-COV-2     Order Specific Question:   Is this test for diagnosis or screening? Answer:   Diagnosis of ill patient     Order Specific Question:   Symptomatic for COVID-19 as defined by CDC? Answer:   Yes     Order Specific Question:   Date of Symptom Onset     Answer:   3/17/2021     Order Specific Question:   Hospitalized for COVID-19? Answer:   No     Order Specific Question:   Admitted to ICU for COVID-19? 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 Answer:   No     Order Specific Question:   Pregnant? Answer:   No     Order Specific Question:   Previously tested for COVID-19?      Answer:   Unknown      Rapid COVID negative  Quarantine, await PCR results    MyChart: active  Provider will call if PCR COVID-19 test is positive      If signs and symptoms become worse the pt is to go to the ER.      Results for orders placed or performed in visit on 03/18/21   AMB POC SARS-COV-2   Result Value Ref Range    SARS-COV-2 POC Negative Negative       Procedures

## 2021-03-19 LAB — SARS-COV-2, NAA: NOT DETECTED

## 2022-03-18 PROBLEM — J45.20 MILD INTERMITTENT ASTHMA WITHOUT COMPLICATION: Status: ACTIVE | Noted: 2018-04-05

## 2022-03-18 PROBLEM — K57.90 DIVERTICULOSIS: Status: ACTIVE | Noted: 2020-01-08

## 2022-03-19 PROBLEM — R10.32 LEFT LOWER QUADRANT PAIN: Status: ACTIVE | Noted: 2018-07-13

## 2022-03-19 PROBLEM — K52.9 COLITIS: Status: ACTIVE | Noted: 2018-07-13

## 2022-03-19 PROBLEM — M15.0 PRIMARY OSTEOARTHRITIS INVOLVING MULTIPLE JOINTS: Status: ACTIVE | Noted: 2017-07-24

## 2022-03-19 PROBLEM — T46.4X5A COUGH DUE TO ACE INHIBITOR: Status: ACTIVE | Noted: 2020-03-01

## 2022-03-19 PROBLEM — J11.1 INFLUENZA: Status: ACTIVE | Noted: 2019-03-11

## 2022-03-19 PROBLEM — I10 ESSENTIAL HYPERTENSION: Status: ACTIVE | Noted: 2020-01-08

## 2022-03-19 PROBLEM — R05.8 COUGH DUE TO ACE INHIBITOR: Status: ACTIVE | Noted: 2020-03-01

## 2022-03-19 PROBLEM — E78.5 DYSLIPIDEMIA: Status: ACTIVE | Noted: 2019-05-02

## 2022-03-19 PROBLEM — M15.9 PRIMARY OSTEOARTHRITIS INVOLVING MULTIPLE JOINTS: Status: ACTIVE | Noted: 2017-07-24

## 2022-03-20 PROBLEM — J20.9 ACUTE BRONCHITIS: Status: ACTIVE | Noted: 2019-03-11

## 2022-03-20 PROBLEM — J01.00 ACUTE NON-RECURRENT MAXILLARY SINUSITIS: Status: ACTIVE | Noted: 2018-03-05

## 2023-05-11 RX ORDER — CLOBETASOL PROPIONATE 0.5 MG/G
CREAM TOPICAL
COMMUNITY
Start: 2020-03-02

## 2023-05-11 RX ORDER — FOLIC ACID 1 MG/1
TABLET ORAL
COMMUNITY

## 2023-05-11 RX ORDER — VALSARTAN 80 MG/1
TABLET ORAL DAILY
COMMUNITY
Start: 2020-03-16

## 2023-05-11 RX ORDER — ASCORBIC ACID 250 MG
TABLET ORAL
COMMUNITY

## 2023-05-11 RX ORDER — LANOLIN ALCOHOL/MO/W.PET/CERES
1 CREAM (GRAM) TOPICAL
COMMUNITY

## 2023-05-11 RX ORDER — CETIRIZINE HYDROCHLORIDE 10 MG/1
TABLET ORAL
COMMUNITY

## 2023-05-16 ENCOUNTER — OFFICE VISIT (OUTPATIENT)
Age: 53
End: 2023-05-16

## 2023-05-16 VITALS
BODY MASS INDEX: 41.86 KG/M2 | HEIGHT: 69 IN | OXYGEN SATURATION: 98 % | HEART RATE: 82 BPM | DIASTOLIC BLOOD PRESSURE: 68 MMHG | RESPIRATION RATE: 18 BRPM | SYSTOLIC BLOOD PRESSURE: 117 MMHG | TEMPERATURE: 98.2 F | WEIGHT: 282.6 LBS

## 2023-05-16 DIAGNOSIS — H10.9 BACTERIAL CONJUNCTIVITIS OF BOTH EYES: Primary | ICD-10-CM

## 2023-05-16 DIAGNOSIS — B96.89 BACTERIAL CONJUNCTIVITIS OF BOTH EYES: Primary | ICD-10-CM

## 2023-05-16 DIAGNOSIS — J06.9 VIRAL URI WITH COUGH: ICD-10-CM

## 2023-05-16 RX ORDER — FUROSEMIDE 20 MG/1
TABLET ORAL
COMMUNITY
Start: 2023-05-01

## 2023-05-16 RX ORDER — BENZONATATE 200 MG/1
200 CAPSULE ORAL 3 TIMES DAILY PRN
Qty: 21 CAPSULE | Refills: 0 | Status: SHIPPED | OUTPATIENT
Start: 2023-05-16 | End: 2023-05-23

## 2023-05-16 RX ORDER — SEMAGLUTIDE 1.34 MG/ML
INJECTION, SOLUTION SUBCUTANEOUS
COMMUNITY
Start: 2023-04-26

## 2023-05-16 RX ORDER — POLYMYXIN B SULFATE AND TRIMETHOPRIM 1; 10000 MG/ML; [USP'U]/ML
1 SOLUTION OPHTHALMIC
Qty: 10 ML | Refills: 0 | Status: SHIPPED | OUTPATIENT
Start: 2023-05-16 | End: 2023-05-23

## 2023-05-16 ASSESSMENT — ENCOUNTER SYMPTOMS
EYE ITCHING: 1
EYE PAIN: 1
PHOTOPHOBIA: 0
EYE REDNESS: 1
COUGH: 1
EYE DISCHARGE: 1

## 2023-05-16 NOTE — PATIENT INSTRUCTIONS
-    Over the counter lubricating eye drops  -    Allergy medication daily (zyrtec, claritin, allegra) and small benadryl at night  -    Frequent hand washing.  -     Discard any contact lenses or make up. Wear glasses during duration of antibiotic treatment. -    Follow up with opthalmologist/optometrist if symptoms worsens.

## 2023-05-16 NOTE — PROGRESS NOTES
Chief Complaint   Patient presents with    Eye Problem     Started yesterday; bilateral; reddened/green and yellow discharge/painful/cough/congestion/blurred vision/sinus pressure; no home covid testing; taking mucinex     HISTORY OF PRESENT ILLNESS  Minor Ronaldo is a 47 y.o. female. Patient reports x 1 day of eye pain, discharge, swelling and itching. Reports this morning there was large mount of yellow discharge to both eyes and eyes are constantly tearing a thin film. Has tried warm/cold compress, zyrtec and allergy eye drops with no relief. In addition is now having a dry cough that began today. Review of Systems   Eyes:  Positive for pain, discharge, redness (bilateral eyes) and itching. Negative for photophobia and visual disturbance. Respiratory:  Positive for cough. All other systems reviewed and are negative. Physical Exam  Constitutional:       Appearance: Normal appearance. She is well-developed and well-groomed. She is not ill-appearing. HENT:      Head: Normocephalic. Eyes:      General: Lids are normal.         Right eye: Discharge present. Left eye: Discharge present. Pupils: Pupils are equal, round, and reactive to light. Comments: Bilateral eyes: swelling to upper eye lids with sclera/conjunctiva injection. Cardiovascular:      Rate and Rhythm: Normal rate. Pulses: Normal pulses. Heart sounds: Normal heart sounds. Pulmonary:      Effort: Pulmonary effort is normal.      Breath sounds: Normal breath sounds.      Past Medical History:   Diagnosis Date    Asthma     Diverticulitis 09/2018    Edema of extremities 7/27/2010    Incontinence 7/27/2010    Indigestion 7/27/2010    Joint pain 7/27/2010    Morbid obesity ([address] 7/27/2010    Reflux 7/27/2010     Past Surgical History:   Procedure Laterality Date    LAP, PLACE ADJUST GASTR BAND  12/4/08    ORTHOPEDIC SURGERY      trigger finger    [place] UNLISTED PROCEDURE ABDOMEN PERITONEUM & OMENTUM      Lap

## 2023-05-16 NOTE — PROGRESS NOTES
Chief Complaint   Patient presents with    Eye Problem     Started yesterday; bilateral; reddened/green and yellow discharge/painful/cough/congestion/blurred vision/sinus pressure; no home covid testing; taking mucinex     Vitals:    05/16/23 0810   BP: 117/68   Pulse: 82   Resp: 18   Temp: 98.2 °F (36.8 °C)   SpO2: 98%     1. Have you been to the ER, urgent care clinic since your last visit? Hospitalized since your last visit? No    2. Have you seen or consulted any other health care providers outside of the [address] [place] [address] since your last visit? Include any pap smears or colon screening.  No

## 2023-05-18 ENCOUNTER — OFFICE VISIT (OUTPATIENT)
Age: 53
End: 2023-05-18

## 2023-05-18 VITALS
RESPIRATION RATE: 16 BRPM | WEIGHT: 282 LBS | SYSTOLIC BLOOD PRESSURE: 117 MMHG | HEART RATE: 76 BPM | HEIGHT: 68 IN | TEMPERATURE: 98.3 F | DIASTOLIC BLOOD PRESSURE: 72 MMHG | OXYGEN SATURATION: 95 % | BODY MASS INDEX: 42.74 KG/M2

## 2023-05-18 DIAGNOSIS — J01.90 ACUTE BACTERIAL RHINOSINUSITIS: Primary | ICD-10-CM

## 2023-05-18 DIAGNOSIS — H69.82 EUSTACHIAN TUBE DYSFUNCTION, LEFT: ICD-10-CM

## 2023-05-18 DIAGNOSIS — B96.89 ACUTE BACTERIAL RHINOSINUSITIS: Primary | ICD-10-CM

## 2023-05-18 RX ORDER — METHYLPREDNISOLONE 4 MG/1
TABLET ORAL
Qty: 1 KIT | Refills: 0 | Status: SHIPPED | OUTPATIENT
Start: 2023-05-18 | End: 2023-05-24

## 2023-05-18 RX ORDER — AMOXICILLIN AND CLAVULANATE POTASSIUM 875; 125 MG/1; MG/1
1 TABLET, FILM COATED ORAL 2 TIMES DAILY
Qty: 20 TABLET | Refills: 0 | Status: SHIPPED | OUTPATIENT
Start: 2023-05-18 | End: 2023-05-28

## 2023-05-18 ASSESSMENT — ENCOUNTER SYMPTOMS
RESPIRATORY NEGATIVE: 1
SINUS PAIN: 1
SINUS PRESSURE: 1
EYE REDNESS: 1
EYE ITCHING: 1
GASTROINTESTINAL NEGATIVE: 1

## 2023-05-18 NOTE — PROGRESS NOTES
Chief Complaint   Patient presents with    Otalgia     Ear ache Left ear. Sinusitis    Conjunctivitis     HISTORY OF PRESENT ILLNESS  Ivonne Gonzalez is a 47 y.o. female. Was seen two days ago for pink eye. She is here for Left ear pain today. Associated sore throat, cough, and nasal congestion. She is taking mucinex and tessalon perles, ibuprofen with some relief. Benadryl at night. She notes symptoms started Monday with eye complaints. Runny nose has improved. No cough. She denies fever, chills, or body aches      Review of Systems   Constitutional: Negative. HENT:  Positive for congestion, ear pain, sinus pressure and sinus pain. Eyes:  Positive for redness and itching. Respiratory: Negative. Cardiovascular: Negative. Gastrointestinal: Negative. Past Medical History:   Diagnosis Date    Asthma     Diverticulitis 09/2018    Edema of extremities 7/27/2010    Incontinence 7/27/2010    Indigestion 7/27/2010    Joint pain 7/27/2010    Morbid obesity ([address] 7/27/2010    Reflux 7/27/2010     Past Surgical History:   Procedure Laterality Date    LAP, PLACE ADJUST GASTR BAND  12/4/08    ORTHOPEDIC SURGERY      trigger finger    [place] UNLISTED PROCEDURE ABDOMEN PERITONEUM & OMENTUM      Lap band '08, hernia     Physical Exam  Vitals and nursing note reviewed. Constitutional:       Appearance: She is not ill-appearing. HENT:      Right Ear: Tympanic membrane normal.      Left Ear: Tympanic membrane normal.      Nose: Congestion present. Mouth/Throat:      Mouth: Mucous membranes are moist.      Pharynx: Oropharynx is clear. Comments: No localized LAD  Cardiovascular:      Rate and Rhythm: Normal rate and regular rhythm. Pulmonary:      Effort: Pulmonary effort is normal. No respiratory distress. Breath sounds: Normal breath sounds. No wheezing or rhonchi. Neurological:      Mental Status: She is alert.      Vitals:    05/18/23 1714   BP: 117/72   Pulse: 76   Resp: 16   Temp:

## 2023-12-02 NOTE — PROGRESS NOTES
Vitals:    05/18/23 1714   BP: 117/72   Pulse: 76   Resp: 16   Temp: 98.3 °F (36.8 °C)   SpO2: 95%     Ear ache left ear. Sinus issues. Xray Chest 1 View- PORTABLE-Urgent

## 2024-10-24 PROBLEM — Z00.00 ANNUAL PHYSICAL EXAM: Status: ACTIVE | Noted: 2024-10-24

## 2024-10-25 ENCOUNTER — OFFICE VISIT (OUTPATIENT)
Facility: CLINIC | Age: 54
End: 2024-10-25
Payer: COMMERCIAL

## 2024-10-25 VITALS
BODY MASS INDEX: 44.41 KG/M2 | SYSTOLIC BLOOD PRESSURE: 110 MMHG | WEIGHT: 293 LBS | HEIGHT: 68 IN | DIASTOLIC BLOOD PRESSURE: 60 MMHG | TEMPERATURE: 98 F | HEART RATE: 75 BPM | RESPIRATION RATE: 17 BRPM | OXYGEN SATURATION: 97 %

## 2024-10-25 DIAGNOSIS — I10 ESSENTIAL HYPERTENSION: ICD-10-CM

## 2024-10-25 DIAGNOSIS — F32.A DEPRESSION, UNSPECIFIED DEPRESSION TYPE: ICD-10-CM

## 2024-10-25 DIAGNOSIS — M15.0 PRIMARY OSTEOARTHRITIS INVOLVING MULTIPLE JOINTS: ICD-10-CM

## 2024-10-25 DIAGNOSIS — K21.9 GASTROESOPHAGEAL REFLUX DISEASE WITHOUT ESOPHAGITIS: ICD-10-CM

## 2024-10-25 DIAGNOSIS — Z00.00 ANNUAL PHYSICAL EXAM: Primary | ICD-10-CM

## 2024-10-25 DIAGNOSIS — E66.01 MORBID OBESITY: ICD-10-CM

## 2024-10-25 DIAGNOSIS — E78.5 DYSLIPIDEMIA: ICD-10-CM

## 2024-10-25 DIAGNOSIS — J45.20 MILD INTERMITTENT ASTHMA WITHOUT COMPLICATION: ICD-10-CM

## 2024-10-25 DIAGNOSIS — B36.9 FUNGAL DERMATITIS: ICD-10-CM

## 2024-10-25 PROCEDURE — 99386 PREV VISIT NEW AGE 40-64: CPT | Performed by: INTERNAL MEDICINE

## 2024-10-25 PROCEDURE — 3074F SYST BP LT 130 MM HG: CPT | Performed by: INTERNAL MEDICINE

## 2024-10-25 PROCEDURE — 3078F DIAST BP <80 MM HG: CPT | Performed by: INTERNAL MEDICINE

## 2024-10-25 RX ORDER — PHENTERMINE HYDROCHLORIDE 15 MG/1
15 CAPSULE ORAL EVERY MORNING
Qty: 30 CAPSULE | Refills: 0 | Status: SHIPPED | OUTPATIENT
Start: 2024-10-25 | End: 2024-11-24

## 2024-10-25 RX ORDER — BUPROPION HYDROCHLORIDE 150 MG/1
150 TABLET ORAL EVERY MORNING
Qty: 30 TABLET | Refills: 3 | Status: SHIPPED | OUTPATIENT
Start: 2024-10-25

## 2024-10-25 RX ORDER — FLUCONAZOLE 150 MG/1
150 TABLET ORAL
Qty: 2 TABLET | Refills: 0 | Status: SHIPPED | OUTPATIENT
Start: 2024-10-25 | End: 2024-10-31

## 2024-10-25 ASSESSMENT — PATIENT HEALTH QUESTIONNAIRE - PHQ9
1. LITTLE INTEREST OR PLEASURE IN DOING THINGS: NOT AT ALL
SUM OF ALL RESPONSES TO PHQ QUESTIONS 1-9: 0
SUM OF ALL RESPONSES TO PHQ9 QUESTIONS 1 & 2: 0
2. FEELING DOWN, DEPRESSED OR HOPELESS: NOT AT ALL

## 2024-10-25 NOTE — PROGRESS NOTES
Evi Juarez is a 54 y.o. female     Chief Complaint   Patient presents with    Establish Care     Re-establish care       /60 (Site: Left Upper Arm, Position: Sitting, Cuff Size: Large Adult)   Pulse 75   Temp 98 °F (36.7 °C) (Oral)   Resp 17   Ht 1.727 m (5' 8\")   Wt 135.5 kg (298 lb 11.2 oz)   SpO2 97%   BMI 45.42 kg/m²     Health Maintenance Due   Topic Date Due    Depression Screen  Never done    HIV screen  Never done    Hepatitis C screen  Never done    Hepatitis B vaccine (1 of 3 - 19+ 3-dose series) Never done    Cervical cancer screen  Never done    Diabetes screen  Never done    Lipids  Never done    Pneumococcal 0-64 years Vaccine (2 of 2 - PCV) 07/26/2019    Breast cancer screen  08/19/2022    Flu vaccine (1) 08/01/2024    COVID-19 Vaccine (3 - 2023-24 season) 09/01/2024         \"Have you been to the ER, urgent care clinic since your last visit?  Hospitalized since your last visit?\"    NO    “Have you seen or consulted any other health care providers outside of [facility] since your last visit?”    NO       Have you had a mammogram?”   NO    Date of last Mammogram: 8/19/2020      “Have you had a pap smear?”    NO    No cervical cancer screening on file                  
Insecurity: Not on file   Transportation Needs: Not on file   Physical Activity: Not on file   Stress: Not on file   Social Connections: Not on file   Intimate Partner Violence: Not on file   Housing Stability: Not on file        ROS:     Constitutional: She denies fevers, weight loss, sweats.  Eyes: No blurred or double vision.  ENT: No difficulty with swallowing, taste, speech or smell.  NECK: no stiffness swelling or lymph node enlargement  Respiratory: No cough wheezing or shortness of breath.  Cardiovascular: Denies chest pain, palpitations, unexplained indigestion or syncope.  Breast: She has noted no masses or lumps and no discharge or axillary swelling  Gastrointestinal:  No changes in bowel movements, no abdominal pain, no bloating.  Genitourinary: No discharge or abnormal bleeding or pain  Extremities: No joint pain, stiffness or swelling.  Neurological:  No numbness, tingling, burring paresthesias or loss of motor strength.  No syncope, dizziness or frequent headache  Skin:  No recent rashes or mole changes.  Positive for rash under her breast  Psychiatric/Behavioral: Positive for depression without suicidal thoughts ideations.  The patient is not nervous/anxious.   HEMATOLOGIC: no easy bruising or bleeding gums  Endocrine: no sweats of urinary frequency or excessive thirst      Physical Examination:     Vitals:    10/25/24 1614   BP: 110/60   Site: Left Upper Arm   Position: Sitting   Cuff Size: Large Adult   Pulse: 75   Resp: 17   Temp: 98 °F (36.7 °C)   TempSrc: Oral   SpO2: 97%   Weight: 135.5 kg (298 lb 11.2 oz)   Height: 1.727 m (5' 8\")       General appearance -morbidly obese white female, and in no distress  Mental status - alert, oriented to person, place, and time  HEENT:  Ears - bilateral TM's and external ear canals clear  Eyes - pupillary responses were normal.  Extraocular muscle function intact.  Lids and conjunctiva not injected.  Fundoscopic exam revealed sharp disc margins.eye movements

## 2024-10-26 LAB
ALBUMIN SERPL-MCNC: 3.9 G/DL (ref 3.5–5)
ALBUMIN/GLOB SERPL: 1 (ref 1.1–2.2)
ALP SERPL-CCNC: 78 U/L (ref 45–117)
ALT SERPL-CCNC: 25 U/L (ref 12–78)
ANION GAP SERPL CALC-SCNC: 5 MMOL/L (ref 2–12)
APPEARANCE UR: CLEAR
AST SERPL-CCNC: 17 U/L (ref 15–37)
BACTERIA URNS QL MICRO: NEGATIVE /HPF
BASOPHILS # BLD: 0.1 K/UL (ref 0–0.1)
BASOPHILS NFR BLD: 1 % (ref 0–1)
BILIRUB SERPL-MCNC: 0.5 MG/DL (ref 0.2–1)
BILIRUB UR QL: NEGATIVE
BUN SERPL-MCNC: 19 MG/DL (ref 6–20)
BUN/CREAT SERPL: 21 (ref 12–20)
CALCIUM SERPL-MCNC: 9.4 MG/DL (ref 8.5–10.1)
CHLORIDE SERPL-SCNC: 100 MMOL/L (ref 97–108)
CHOLEST SERPL-MCNC: 222 MG/DL
CK SERPL-CCNC: 64 U/L (ref 26–192)
CO2 SERPL-SCNC: 30 MMOL/L (ref 21–32)
COLOR UR: ABNORMAL
CREAT SERPL-MCNC: 0.91 MG/DL (ref 0.55–1.02)
DIFFERENTIAL METHOD BLD: ABNORMAL
EOSINOPHIL # BLD: 0.2 K/UL (ref 0–0.4)
EOSINOPHIL NFR BLD: 2 % (ref 0–7)
EPITH CASTS URNS QL MICRO: ABNORMAL /LPF
ERYTHROCYTE [DISTWIDTH] IN BLOOD BY AUTOMATED COUNT: 13.2 % (ref 11.5–14.5)
EST. AVERAGE GLUCOSE BLD GHB EST-MCNC: 108 MG/DL
GLOBULIN SER CALC-MCNC: 3.9 G/DL (ref 2–4)
GLUCOSE SERPL-MCNC: 98 MG/DL (ref 65–100)
GLUCOSE UR STRIP.AUTO-MCNC: NEGATIVE MG/DL
HBA1C MFR BLD: 5.4 % (ref 4–5.6)
HCT VFR BLD AUTO: 42.3 % (ref 35–47)
HDLC SERPL-MCNC: 68 MG/DL
HDLC SERPL: 3.3 (ref 0–5)
HGB BLD-MCNC: 13.5 G/DL (ref 11.5–16)
HGB UR QL STRIP: NEGATIVE
HYALINE CASTS URNS QL MICRO: ABNORMAL /LPF (ref 0–5)
IMM GRANULOCYTES # BLD AUTO: 0 K/UL (ref 0–0.04)
IMM GRANULOCYTES NFR BLD AUTO: 0 % (ref 0–0.5)
KETONES UR QL STRIP.AUTO: NEGATIVE MG/DL
LDLC SERPL CALC-MCNC: 140.8 MG/DL (ref 0–100)
LEUKOCYTE ESTERASE UR QL STRIP.AUTO: ABNORMAL
LYMPHOCYTES # BLD: 3.1 K/UL (ref 0.8–3.5)
LYMPHOCYTES NFR BLD: 25 % (ref 12–49)
MCH RBC QN AUTO: 30 PG (ref 26–34)
MCHC RBC AUTO-ENTMCNC: 31.9 G/DL (ref 30–36.5)
MCV RBC AUTO: 94 FL (ref 80–99)
MONOCYTES # BLD: 1 K/UL (ref 0–1)
MONOCYTES NFR BLD: 8 % (ref 5–13)
NEUTS SEG # BLD: 8.3 K/UL (ref 1.8–8)
NEUTS SEG NFR BLD: 64 % (ref 32–75)
NITRITE UR QL STRIP.AUTO: NEGATIVE
NRBC # BLD: 0 K/UL (ref 0–0.01)
NRBC BLD-RTO: 0 PER 100 WBC
PH UR STRIP: 5.5 (ref 5–8)
PLATELET # BLD AUTO: 363 K/UL (ref 150–400)
PMV BLD AUTO: 10.4 FL (ref 8.9–12.9)
POTASSIUM SERPL-SCNC: 3.9 MMOL/L (ref 3.5–5.1)
PROT SERPL-MCNC: 7.8 G/DL (ref 6.4–8.2)
PROT UR STRIP-MCNC: NEGATIVE MG/DL
RBC # BLD AUTO: 4.5 M/UL (ref 3.8–5.2)
RBC #/AREA URNS HPF: ABNORMAL /HPF (ref 0–5)
SODIUM SERPL-SCNC: 135 MMOL/L (ref 136–145)
SP GR UR REFRACTOMETRY: 1.02 (ref 1–1.03)
T4 FREE SERPL-MCNC: 1.2 NG/DL (ref 0.8–1.5)
TRIGL SERPL-MCNC: 66 MG/DL
TSH SERPL DL<=0.05 MIU/L-ACNC: 1.49 UIU/ML (ref 0.36–3.74)
UROBILINOGEN UR QL STRIP.AUTO: 0.2 EU/DL (ref 0.2–1)
VLDLC SERPL CALC-MCNC: 13.2 MG/DL
WBC # BLD AUTO: 12.7 K/UL (ref 3.6–11)
WBC URNS QL MICRO: ABNORMAL /HPF (ref 0–4)

## 2024-11-23 PROBLEM — Z00.00 ANNUAL PHYSICAL EXAM: Status: RESOLVED | Noted: 2024-10-24 | Resolved: 2024-11-23

## 2024-11-25 ENCOUNTER — OFFICE VISIT (OUTPATIENT)
Facility: CLINIC | Age: 54
End: 2024-11-25
Payer: COMMERCIAL

## 2024-11-25 VITALS
BODY MASS INDEX: 44.41 KG/M2 | DIASTOLIC BLOOD PRESSURE: 76 MMHG | HEART RATE: 72 BPM | WEIGHT: 293 LBS | HEIGHT: 68 IN | SYSTOLIC BLOOD PRESSURE: 134 MMHG | OXYGEN SATURATION: 97 % | TEMPERATURE: 98.4 F

## 2024-11-25 DIAGNOSIS — F32.A DEPRESSION, UNSPECIFIED DEPRESSION TYPE: ICD-10-CM

## 2024-11-25 DIAGNOSIS — I10 ESSENTIAL HYPERTENSION: ICD-10-CM

## 2024-11-25 DIAGNOSIS — E66.01 MORBID OBESITY: Primary | ICD-10-CM

## 2024-11-25 PROCEDURE — 3078F DIAST BP <80 MM HG: CPT | Performed by: INTERNAL MEDICINE

## 2024-11-25 PROCEDURE — 99213 OFFICE O/P EST LOW 20 MIN: CPT | Performed by: INTERNAL MEDICINE

## 2024-11-25 PROCEDURE — 3074F SYST BP LT 130 MM HG: CPT | Performed by: INTERNAL MEDICINE

## 2024-11-25 RX ORDER — PHENTERMINE HYDROCHLORIDE 30 MG/1
30 CAPSULE ORAL EVERY MORNING
Qty: 30 CAPSULE | Refills: 0 | Status: SHIPPED | OUTPATIENT
Start: 2024-11-25 | End: 2024-12-25

## 2024-11-25 RX ORDER — PHENTERMINE HYDROCHLORIDE 15 MG/1
15 CAPSULE ORAL EVERY MORNING
COMMUNITY

## 2024-11-25 RX ORDER — MULTIVIT-MIN/IRON/FOLIC ACID/K 18-600-40
2000 CAPSULE ORAL DAILY
COMMUNITY

## 2024-11-25 SDOH — ECONOMIC STABILITY: FOOD INSECURITY: WITHIN THE PAST 12 MONTHS, THE FOOD YOU BOUGHT JUST DIDN'T LAST AND YOU DIDN'T HAVE MONEY TO GET MORE.: NEVER TRUE

## 2024-11-25 SDOH — ECONOMIC STABILITY: FOOD INSECURITY: WITHIN THE PAST 12 MONTHS, YOU WORRIED THAT YOUR FOOD WOULD RUN OUT BEFORE YOU GOT MONEY TO BUY MORE.: NEVER TRUE

## 2024-11-25 SDOH — ECONOMIC STABILITY: TRANSPORTATION INSECURITY
IN THE PAST 12 MONTHS, HAS LACK OF TRANSPORTATION KEPT YOU FROM MEETINGS, WORK, OR FROM GETTING THINGS NEEDED FOR DAILY LIVING?: NO

## 2024-11-25 SDOH — ECONOMIC STABILITY: INCOME INSECURITY: HOW HARD IS IT FOR YOU TO PAY FOR THE VERY BASICS LIKE FOOD, HOUSING, MEDICAL CARE, AND HEATING?: NOT VERY HARD

## 2024-11-25 ASSESSMENT — PATIENT HEALTH QUESTIONNAIRE - PHQ9
1. LITTLE INTEREST OR PLEASURE IN DOING THINGS: SEVERAL DAYS
SUM OF ALL RESPONSES TO PHQ QUESTIONS 1-9: 1
SUM OF ALL RESPONSES TO PHQ QUESTIONS 1-9: 1
2. FEELING DOWN, DEPRESSED OR HOPELESS: NOT AT ALL
SUM OF ALL RESPONSES TO PHQ9 QUESTIONS 1 & 2: 1
SUM OF ALL RESPONSES TO PHQ QUESTIONS 1-9: 1
SUM OF ALL RESPONSES TO PHQ QUESTIONS 1-9: 1

## 2024-11-25 NOTE — PROGRESS NOTES
Chief Complaint   Patient presents with    Weight Management     1 month f/up       SUBJECTIVE:    Evi Juarez is a 54 y.o. female she is tolerated the phentermine 15 mg quite well as result is actually lost a little weight.  She notes no chest pain, shortness of breath, palpitations, PND, orthopnea or other cardiorespiratory complaints.  There are no current GI or  complaints.  She has no other complaints noted.      Current Outpatient Medications   Medication Sig Dispense Refill    vitamin D 50 MCG (2000 UT) CAPS capsule Take 1 capsule by mouth daily      phentermine 15 MG capsule Take 1 capsule by mouth every morning. Max Daily Amount: 15 mg      phentermine 30 MG capsule Take 1 capsule by mouth every morning for 30 days. Max Daily Amount: 30 mg 30 capsule 0    buPROPion (WELLBUTRIN XL) 150 MG extended release tablet Take 1 tablet by mouth every morning 30 tablet 3    ascorbic acid (VITAMIN C) 250 MG tablet Take by mouth      cetirizine (ZYRTEC) 10 MG tablet Take by mouth      clobetasol (TEMOVATE) 0.05 % cream APPLY EXTERNALLY TO THE AFFECTED AREA TWICE DAILY AS NEEDED      valsartan (DIOVAN) 80 MG tablet Take by mouth daily       No current facility-administered medications for this visit.     Past Medical History:   Diagnosis Date    Asthma     Depression     Diverticulitis 09/2018    Edema of extremities 7/27/2010    Hypertension     Incontinence 7/27/2010    Indigestion 7/27/2010    Joint pain 7/27/2010    Morbid obesity 7/27/2010    Reflux 7/27/2010     Past Surgical History:   Procedure Laterality Date    LAP, PLACE ADJUST GASTR BAND  12/4/08    ORTHOPEDIC SURGERY      trigger finger    [place] UNLISTED PROCEDURE ABDOMEN PERITONEUM & OMENTUM      Lap band '08, hernia     Allergies   Allergen Reactions    Metronidazole Diarrhea    Otezla [Apremilast] Swelling    Lisinopril Cough       REVIEW OF SYSTEMS:  General: negative for - chills or fever, or weight loss or gain  ENT: negative for - headaches,

## 2024-11-25 NOTE — PROGRESS NOTES
Evi Juarez is a 54 y.o. female     Chief Complaint   Patient presents with    Weight Management     1 month f/up       \"Have you been to the ER, urgent care clinic since your last visit?  Hospitalized since your last visit?\"    NO    “Have you seen or consulted any other health care providers outside of [facility] since your last visit?”    NO       Have you had a mammogram?”   NO; Scheduled 1-2025    Date of last Mammogram: 8/19/2020      “Have you had a pap smear?”    No; scheduled 1-2025    No cervical cancer screening on file

## 2024-12-23 NOTE — PROGRESS NOTES
Chief Complaint   Patient presents with    1 Month Follow-Up     HTN       SUBJECTIVE:    Evi Juarez is a 54 y.o. female who returns in follow-up regarding her hypertension and morbid obesity.  2 months ago she was started on phentermine 15 mg daily which time her weight was 299 pounds and she was seen in follow-up a month after that with weight down 5 pounds to 294 pounds and phentermine dose increased to 30 mg which she has been taking through the holidays and seems to be doing well with it.  She currently notes no chest pain, shortness of breath or cardiorespiratory complaints.  She notes no other complaints of complete review of systems.      Current Outpatient Medications   Medication Sig Dispense Refill    buPROPion (WELLBUTRIN XL) 150 MG extended release tablet Take 1 tablet by mouth every morning 30 tablet 3    phentermine (ADIPEX-P) 37.5 MG tablet Take 1 tablet by mouth every morning (before breakfast) for 30 days. Max Daily Amount: 37.5 mg 30 tablet 0    vitamin D 50 MCG (2000 UT) CAPS capsule Take 1 capsule by mouth daily      ascorbic acid (VITAMIN C) 250 MG tablet Take by mouth      cetirizine (ZYRTEC) 10 MG tablet Take by mouth      clobetasol (TEMOVATE) 0.05 % cream APPLY EXTERNALLY TO THE AFFECTED AREA TWICE DAILY AS NEEDED      valsartan (DIOVAN) 80 MG tablet Take by mouth daily       No current facility-administered medications for this visit.     Past Medical History:   Diagnosis Date    Asthma     Depression     Diverticulitis 09/2018    Edema of extremities 7/27/2010    Hypertension     Incontinence 7/27/2010    Indigestion 7/27/2010    Joint pain 7/27/2010    Morbid obesity 7/27/2010    Reflux 7/27/2010     Past Surgical History:   Procedure Laterality Date    LAP, PLACE ADJUST GASTR BAND  12/4/08    ORTHOPEDIC SURGERY      trigger finger    [place] UNLISTED PROCEDURE ABDOMEN PERITONEUM & OMENTUM      Lap band '08, hernia     Allergies   Allergen Reactions    Metronidazole Diarrhea

## 2024-12-24 ENCOUNTER — OFFICE VISIT (OUTPATIENT)
Facility: CLINIC | Age: 54
End: 2024-12-24
Payer: COMMERCIAL

## 2024-12-24 VITALS
OXYGEN SATURATION: 98 % | SYSTOLIC BLOOD PRESSURE: 120 MMHG | HEIGHT: 68 IN | HEART RATE: 71 BPM | RESPIRATION RATE: 17 BRPM | TEMPERATURE: 98.1 F | WEIGHT: 291.7 LBS | DIASTOLIC BLOOD PRESSURE: 80 MMHG | BODY MASS INDEX: 44.21 KG/M2

## 2024-12-24 DIAGNOSIS — I10 ESSENTIAL HYPERTENSION: Primary | ICD-10-CM

## 2024-12-24 DIAGNOSIS — E66.01 MORBID OBESITY: ICD-10-CM

## 2024-12-24 PROCEDURE — 99213 OFFICE O/P EST LOW 20 MIN: CPT | Performed by: INTERNAL MEDICINE

## 2024-12-24 PROCEDURE — 3074F SYST BP LT 130 MM HG: CPT | Performed by: INTERNAL MEDICINE

## 2024-12-24 PROCEDURE — 3079F DIAST BP 80-89 MM HG: CPT | Performed by: INTERNAL MEDICINE

## 2024-12-24 RX ORDER — PHENTERMINE HYDROCHLORIDE 37.5 MG/1
37.5 TABLET ORAL
Qty: 30 TABLET | Refills: 0 | Status: SHIPPED | OUTPATIENT
Start: 2024-12-24 | End: 2025-01-23

## 2024-12-24 RX ORDER — BUPROPION HYDROCHLORIDE 150 MG/1
150 TABLET ORAL EVERY MORNING
Qty: 30 TABLET | Refills: 3 | Status: SHIPPED | OUTPATIENT
Start: 2024-12-24

## 2024-12-24 NOTE — PROGRESS NOTES
Evi Juarez is a 54 y.o. female     Chief Complaint   Patient presents with    1 Month Follow-Up     HTN       /80 (Site: Left Upper Arm, Position: Sitting, Cuff Size: Large Adult)   Pulse 71   Temp 98.1 °F (36.7 °C) (Oral)   Resp 17   Ht 1.727 m (5' 8\")   Wt 132.3 kg (291 lb 11.2 oz)   SpO2 98%   BMI 44.35 kg/m²     Health Maintenance Due   Topic Date Due    HIV screen  Never done    Hepatitis C screen  Never done    Hepatitis B vaccine (1 of 3 - 19+ 3-dose series) Never done    Cervical cancer screen  Never done    Pneumococcal 0-64 years Vaccine (2 of 2 - PCV) 07/26/2019    Breast cancer screen  08/19/2022    Flu vaccine (1) 08/01/2024    COVID-19 Vaccine (3 - 2023-24 season) 09/01/2024         \"Have you been to the ER, urgent care clinic since your last visit?  Hospitalized since your last visit?\"    NO    “Have you seen or consulted any other health care providers outside of [facility] since your last visit?”    NO       Have you had a mammogram?”   NO    Date of last Mammogram: 8/19/2020      “Have you had a pap smear?”    NO    No cervical cancer screening on file

## 2025-01-31 ENCOUNTER — OFFICE VISIT (OUTPATIENT)
Facility: CLINIC | Age: 55
End: 2025-01-31
Payer: COMMERCIAL

## 2025-01-31 VITALS
WEIGHT: 293 LBS | RESPIRATION RATE: 16 BRPM | HEIGHT: 68 IN | OXYGEN SATURATION: 98 % | HEART RATE: 69 BPM | SYSTOLIC BLOOD PRESSURE: 120 MMHG | BODY MASS INDEX: 44.41 KG/M2 | DIASTOLIC BLOOD PRESSURE: 80 MMHG | TEMPERATURE: 98 F

## 2025-01-31 DIAGNOSIS — I10 ESSENTIAL HYPERTENSION: ICD-10-CM

## 2025-01-31 DIAGNOSIS — E66.01 MORBID OBESITY: Primary | ICD-10-CM

## 2025-01-31 PROCEDURE — 3079F DIAST BP 80-89 MM HG: CPT | Performed by: INTERNAL MEDICINE

## 2025-01-31 PROCEDURE — 99213 OFFICE O/P EST LOW 20 MIN: CPT | Performed by: INTERNAL MEDICINE

## 2025-01-31 PROCEDURE — 3074F SYST BP LT 130 MM HG: CPT | Performed by: INTERNAL MEDICINE

## 2025-01-31 RX ORDER — PHENTERMINE HYDROCHLORIDE 37.5 MG/1
37.5 TABLET ORAL
Qty: 30 TABLET | Refills: 0 | Status: SHIPPED | OUTPATIENT
Start: 2025-01-31 | End: 2025-03-02

## 2025-01-31 ASSESSMENT — PATIENT HEALTH QUESTIONNAIRE - PHQ9
SUM OF ALL RESPONSES TO PHQ9 QUESTIONS 1 & 2: 0
SUM OF ALL RESPONSES TO PHQ QUESTIONS 1-9: 0
5. POOR APPETITE OR OVEREATING: NOT AT ALL
SUM OF ALL RESPONSES TO PHQ QUESTIONS 1-9: 0
1. LITTLE INTEREST OR PLEASURE IN DOING THINGS: NOT AT ALL
SUM OF ALL RESPONSES TO PHQ QUESTIONS 1-9: 0
SUM OF ALL RESPONSES TO PHQ QUESTIONS 1-9: 0
4. FEELING TIRED OR HAVING LITTLE ENERGY: NOT AT ALL
10. IF YOU CHECKED OFF ANY PROBLEMS, HOW DIFFICULT HAVE THESE PROBLEMS MADE IT FOR YOU TO DO YOUR WORK, TAKE CARE OF THINGS AT HOME, OR GET ALONG WITH OTHER PEOPLE: NOT DIFFICULT AT ALL
2. FEELING DOWN, DEPRESSED OR HOPELESS: NOT AT ALL
8. MOVING OR SPEAKING SO SLOWLY THAT OTHER PEOPLE COULD HAVE NOTICED. OR THE OPPOSITE, BEING SO FIGETY OR RESTLESS THAT YOU HAVE BEEN MOVING AROUND A LOT MORE THAN USUAL: NOT AT ALL
7. TROUBLE CONCENTRATING ON THINGS, SUCH AS READING THE NEWSPAPER OR WATCHING TELEVISION: NOT AT ALL
3. TROUBLE FALLING OR STAYING ASLEEP: NOT AT ALL
6. FEELING BAD ABOUT YOURSELF - OR THAT YOU ARE A FAILURE OR HAVE LET YOURSELF OR YOUR FAMILY DOWN: NOT AT ALL
9. THOUGHTS THAT YOU WOULD BE BETTER OFF DEAD, OR OF HURTING YOURSELF: NOT AT ALL

## 2025-01-31 NOTE — PROGRESS NOTES
Evi Juarez is a 54 y.o. female     Chief Complaint   Patient presents with    1 Month Follow-Up     HTN F/U       /80 (Site: Left Upper Arm, Position: Sitting, Cuff Size: Large Adult)   Pulse 69   Temp 98 °F (36.7 °C) (Oral)   Resp 16   Ht 1.727 m (5' 8\")   Wt 133 kg (293 lb 3.2 oz)   SpO2 98%   BMI 44.58 kg/m²     Health Maintenance Due   Topic Date Due    HIV screen  Never done    Hepatitis C screen  Never done    Hepatitis B vaccine (1 of 3 - 19+ 3-dose series) Never done    Cervical cancer screen  Never done    Pneumococcal 0-64 years Vaccine (2 of 2 - PCV) 07/26/2019    Breast cancer screen  08/19/2022    Flu vaccine (1) 08/01/2024    COVID-19 Vaccine (3 - 2023-24 season) 09/01/2024         \"Have you been to the ER, urgent care clinic since your last visit?  Hospitalized since your last visit?\"    NO    “Have you seen or consulted any other health care providers outside of [facility] since your last visit?”    NO       Have you had a mammogram?”   NO    Date of last Mammogram: 8/19/2020      “Have you had a pap smear?”    NO    No cervical cancer screening on file                  
  Allergen Reactions    Metronidazole Diarrhea    Otezla [Apremilast] Swelling    Lisinopril Cough       REVIEW OF SYSTEMS:  General: negative for - chills or fever, or weight loss or gain  ENT: negative for - headaches, nasal congestion or tinnitus  Eyes: no blurred or visual changes  Neck: No stiffness or swollen nodes  Respiratory: negative for - cough, hemoptysis, shortness of breath or wheezing  Cardiovascular : negative for - chest pain, edema, palpitations or shortness of breath  Gastrointestinal: negative for - abdominal pain, blood in stools, heartburn or nausea/vomiting  Genito-Urinary: no dysuria, trouble voiding, or hematuria  Musculoskeletal: negative for - gait disturbance, joint pain, joint stiffness or joint swelling  Neurological: no TIA or stroke symptoms  Hematologic: no bruises, no bleeding  Lymphatic: no swollen glands  Integument: no lumps, mole changes, nail changes or rash  Endocrine:no malaise/lethargy poly uria or polydipsia or unexpected weight changes        Social History     Socioeconomic History    Marital status:      Spouse name: None    Number of children: None    Years of education: None    Highest education level: None   Tobacco Use    Smoking status: Never    Smokeless tobacco: Never   Substance and Sexual Activity    Alcohol use: Yes     Comment: social    Drug use: No     Social Determinants of Health     Housing Stability: Unknown (11/25/2024)    Housing Stability Vital Sign     Homeless in the Last Year: No     Family History   Problem Relation Age of Onset    Other Mother 68        aneurysm in her head    Hypertension Mother     Heart Disease Father     High Cholesterol Father        OBJECTIVE:     /80 (Site: Left Upper Arm, Position: Sitting, Cuff Size: Large Adult)   Pulse 69   Temp 98 °F (36.7 °C) (Oral)   Resp 16   Ht 1.727 m (5' 8\")   Wt 133 kg (293 lb 3.2 oz)   SpO2 98%   BMI 44.58 kg/m²   CONSTITUTIONAL:   well nourished, appears age

## 2025-03-05 ENCOUNTER — OFFICE VISIT (OUTPATIENT)
Facility: CLINIC | Age: 55
End: 2025-03-05
Payer: COMMERCIAL

## 2025-03-05 VITALS
DIASTOLIC BLOOD PRESSURE: 84 MMHG | WEIGHT: 291.8 LBS | SYSTOLIC BLOOD PRESSURE: 140 MMHG | HEART RATE: 71 BPM | TEMPERATURE: 97.2 F | BODY MASS INDEX: 44.37 KG/M2 | OXYGEN SATURATION: 98 %

## 2025-03-05 DIAGNOSIS — E66.01 MORBID OBESITY: ICD-10-CM

## 2025-03-05 DIAGNOSIS — I10 ESSENTIAL HYPERTENSION: Primary | ICD-10-CM

## 2025-03-05 PROCEDURE — 3077F SYST BP >= 140 MM HG: CPT | Performed by: INTERNAL MEDICINE

## 2025-03-05 PROCEDURE — 99213 OFFICE O/P EST LOW 20 MIN: CPT | Performed by: INTERNAL MEDICINE

## 2025-03-05 PROCEDURE — 3079F DIAST BP 80-89 MM HG: CPT | Performed by: INTERNAL MEDICINE

## 2025-03-05 RX ORDER — PHENTERMINE HYDROCHLORIDE 37.5 MG/1
37.5 TABLET ORAL
Qty: 30 TABLET | Refills: 0 | Status: SHIPPED | OUTPATIENT
Start: 2025-03-05 | End: 2025-04-04

## 2025-03-05 NOTE — PROGRESS NOTES
\"Have you been to the ER, urgent care clinic since your last visit?  Hospitalized since your last visit?\"    NO    “Have you seen or consulted any other health care providers outside our system since your last visit?”    NO    Have you had a mammogram?”   NO    Date of last Mammogram: 8/19/2020      “Have you had a pap smear?”    NO    No cervical cancer screening on file

## 2025-03-05 NOTE — PROGRESS NOTES
Chief Complaint   Patient presents with    Weight Management     Pt comes in today for a follow up.        SUBJECTIVE:    Evi Juarez is a 54 y.o. female who returns in follow-up regarding her hypertension and morbid obesity.  On her last visit her weight was 293 pounds at which time I put her on phentermine 37.5 mg daily.  She has been tolerated quite well.  She returns today her weight is down to 291 pounds.  She notes no palpitations, chest pain, PND, orthopnea or other cardiac respiratory complaints.  There are no GI or  complaints.  She has met with a  but has not yet really started working with a .      Current Outpatient Medications   Medication Sig Dispense Refill    Medium Chain Triglycerides (MCT OIL PO) Take by mouth      phentermine (ADIPEX-P) 37.5 MG tablet Take 1 tablet by mouth every morning (before breakfast) for 30 days. Max Daily Amount: 37.5 mg 30 tablet 0    vitamin D 50 MCG (2000 UT) CAPS capsule Take 1 capsule by mouth daily      ascorbic acid (VITAMIN C) 250 MG tablet Take by mouth      cetirizine (ZYRTEC) 10 MG tablet Take by mouth      clobetasol (TEMOVATE) 0.05 % cream APPLY EXTERNALLY TO THE AFFECTED AREA TWICE DAILY AS NEEDED      valsartan (DIOVAN) 80 MG tablet Take by mouth daily       No current facility-administered medications for this visit.     Past Medical History:   Diagnosis Date    Asthma     Depression     Diverticulitis 09/2018    Edema of extremities 7/27/2010    Hypertension     Incontinence 7/27/2010    Indigestion 7/27/2010    Joint pain 7/27/2010    Morbid obesity 7/27/2010    Reflux 7/27/2010     Past Surgical History:   Procedure Laterality Date    LAP, PLACE ADJUST GASTR BAND  12/4/08    ORTHOPEDIC SURGERY      trigger finger    [place] UNLISTED PROCEDURE ABDOMEN PERITONEUM & OMENTUM      Lap band '08, hernia     Allergies   Allergen Reactions    Metronidazole Diarrhea    Otezla [Apremilast] Swelling    Lisinopril Cough       REVIEW OF

## 2025-03-06 RX ORDER — VALSARTAN 80 MG/1
80 TABLET ORAL DAILY
Qty: 90 TABLET | Refills: 1 | Status: SHIPPED | OUTPATIENT
Start: 2025-03-06

## 2025-03-06 NOTE — TELEPHONE ENCOUNTER
PCP: Naveen Covarrubias MD    Last appt: 3/5/2025  Future Appointments   Date Time Provider Department Center   4/7/2025  3:00 PM Naveen Covarrubias MD [facility] DEP       Requested Prescriptions     Pending Prescriptions Disp Refills    valsartan (DIOVAN) 80 MG tablet 90 tablet 1     Sig: Take 1 tablet by mouth daily       Prior labs and Blood pressures:  BP Readings from Last 3 Encounters:   03/05/25 (!) 140/84   01/31/25 120/80   12/24/24 120/80     Lab Results   Component Value Date/Time     10/25/2024 05:05 PM    K 3.9 10/25/2024 05:05 PM     10/25/2024 05:05 PM    CO2 30 10/25/2024 05:05 PM    BUN 19 10/25/2024 05:05 PM     No results found for: \"HBA1C\", \"LIV1RQQG\"  Lab Results   Component Value Date/Time    CHOL 222 10/25/2024 05:05 PM    HDL 68 10/25/2024 05:05 PM    .8 10/25/2024 05:05 PM    VLDL 13.2 10/25/2024 05:05 PM     No results found for: \"VITD3\"        Lab Results   Component Value Date/Time    TSH 1.49 10/25/2024 05:05 PM

## 2025-03-06 NOTE — TELEPHONE ENCOUNTER
Disp Refills Start End    valsartan (DIOVAN) 80 MG tablet 90 3 3/16/2020 --    Sig - Route: Take by mouth daily - Oral      Last visit 3/5/25  Future appt 4/7/25    Pharmacy [facility] [place]

## 2025-03-19 ENCOUNTER — TELEPHONE (OUTPATIENT)
Facility: CLINIC | Age: 55
End: 2025-03-19

## 2025-03-19 NOTE — TELEPHONE ENCOUNTER
Patient called in stating that she has an appointment with Dr. Yocasta Read at [facility] in April and that they requested that her lab work within 6 months be sent to them however, the last time she had her labs drawn was in October of last year. Patient would like to see if she can come in for lab work only to check a complete panel and any labs that Dr. Covarrubias feels will be helpful for her on her weight loss journey. She has also begun a exercise program with [employer] as well.

## 2025-03-31 NOTE — PROGRESS NOTES
Chief Complaint   Patient presents with    Follow-up    Hypertension       SUBJECTIVE:    Evi Juarez is a 54 y.o. female who returns in follow-up for her hypertension, obesity, dyslipidemia, asthma, DJD, glucose intolerance and other multiple medical problems.  She has been taking the phentermine now for 2 months with her initial weight in February of 293 pounds and when she returned on 3/5 she was down to 291 and today down to 284 pounds.  She is tolerated quite well.  She denies any chest pain, shortness of breath or cardiac respiratory complaints.  There are no current GI or  complaints.  She has no headaches, dizziness or neurologic complaints.  She has no change of her chronic arthritic complaints and she has no other complaints on complete review of systems.  She is going to be evaluated in a weight loss clinic and she does need blood work for that.  She is not fasting today but would like to come back tomorrow morning fasting get her blood work.  There are no other complaints noted.      Current Outpatient Medications   Medication Sig Dispense Refill    phentermine (ADIPEX-P) 37.5 MG tablet Take 1 tablet by mouth every morning (before breakfast) for 30 days. Max Daily Amount: 37.5 mg 30 tablet 0    valsartan (DIOVAN) 80 MG tablet Take 1 tablet by mouth daily 90 tablet 1    Medium Chain Triglycerides (MCT OIL PO) Take by mouth      vitamin D 50 MCG (2000 UT) CAPS capsule Take 1 capsule by mouth daily      ascorbic acid (VITAMIN C) 250 MG tablet Take by mouth      cetirizine (ZYRTEC) 10 MG tablet Take by mouth      clobetasol (TEMOVATE) 0.05 % cream APPLY EXTERNALLY TO THE AFFECTED AREA TWICE DAILY AS NEEDED       No current facility-administered medications for this visit.     Past Medical History:   Diagnosis Date    Asthma     Depression     Diverticulitis 09/2018    Edema of extremities 7/27/2010    Hypertension     Incontinence 7/27/2010    Indigestion 7/27/2010    Joint pain 7/27/2010

## 2025-04-01 ENCOUNTER — OFFICE VISIT (OUTPATIENT)
Facility: CLINIC | Age: 55
End: 2025-04-01
Payer: COMMERCIAL

## 2025-04-01 VITALS
DIASTOLIC BLOOD PRESSURE: 71 MMHG | BODY MASS INDEX: 43.18 KG/M2 | OXYGEN SATURATION: 98 % | WEIGHT: 284 LBS | HEART RATE: 76 BPM | SYSTOLIC BLOOD PRESSURE: 116 MMHG

## 2025-04-01 DIAGNOSIS — J45.20 MILD INTERMITTENT ASTHMA WITHOUT COMPLICATION: ICD-10-CM

## 2025-04-01 DIAGNOSIS — E78.5 DYSLIPIDEMIA: ICD-10-CM

## 2025-04-01 DIAGNOSIS — K21.9 GASTROESOPHAGEAL REFLUX DISEASE WITHOUT ESOPHAGITIS: ICD-10-CM

## 2025-04-01 DIAGNOSIS — M15.0 PRIMARY OSTEOARTHRITIS INVOLVING MULTIPLE JOINTS: ICD-10-CM

## 2025-04-01 DIAGNOSIS — E66.01 MORBID OBESITY: Primary | ICD-10-CM

## 2025-04-01 DIAGNOSIS — I10 ESSENTIAL HYPERTENSION: ICD-10-CM

## 2025-04-01 PROCEDURE — 99214 OFFICE O/P EST MOD 30 MIN: CPT | Performed by: INTERNAL MEDICINE

## 2025-04-01 PROCEDURE — 3074F SYST BP LT 130 MM HG: CPT | Performed by: INTERNAL MEDICINE

## 2025-04-01 PROCEDURE — 3078F DIAST BP <80 MM HG: CPT | Performed by: INTERNAL MEDICINE

## 2025-04-01 RX ORDER — PHENTERMINE HYDROCHLORIDE 37.5 MG/1
37.5 TABLET ORAL
Qty: 30 TABLET | Refills: 0 | Status: SHIPPED | OUTPATIENT
Start: 2025-04-01 | End: 2025-05-01

## 2025-04-01 SDOH — ECONOMIC STABILITY: FOOD INSECURITY: WITHIN THE PAST 12 MONTHS, YOU WORRIED THAT YOUR FOOD WOULD RUN OUT BEFORE YOU GOT MONEY TO BUY MORE.: NEVER TRUE

## 2025-04-01 SDOH — ECONOMIC STABILITY: FOOD INSECURITY: WITHIN THE PAST 12 MONTHS, THE FOOD YOU BOUGHT JUST DIDN'T LAST AND YOU DIDN'T HAVE MONEY TO GET MORE.: NEVER TRUE

## 2025-04-01 NOTE — PROGRESS NOTES
Chief Complaint   Patient presents with    Follow-up    Hypertension         Health Maintenance Due   Topic Date Due    HIV screen  Never done    Hepatitis C screen  Never done    Hepatitis B vaccine (1 of 3 - 19+ 3-dose series) Never done    Cervical cancer screen  Never done    Pneumococcal 50+ years Vaccine (2 of 2 - PCV) 07/26/2019    Breast cancer screen  08/19/2022    COVID-19 Vaccine (3 - 2024-25 season) 09/01/2024         \"Have you been to the ER, urgent care clinic since your last visit?  Hospitalized since your last visit?\"    NO    “Have you seen or consulted any other health care providers outside of [facility] since your last visit?”    NO       Have you had a mammogram?”   Yes [facility] 2025    Date of last Mammogram: 8/19/2020      “Have you had a pap smear?”    Yes 2024    No cervical cancer screening on file

## 2025-04-02 ENCOUNTER — LAB (OUTPATIENT)
Facility: CLINIC | Age: 55
End: 2025-04-02

## 2025-04-02 DIAGNOSIS — E78.5 DYSLIPIDEMIA: ICD-10-CM

## 2025-04-02 DIAGNOSIS — E66.01 MORBID OBESITY: ICD-10-CM

## 2025-04-03 LAB
ALBUMIN SERPL-MCNC: 3.8 G/DL (ref 3.5–5)
ALBUMIN/GLOB SERPL: 1.2 (ref 1.1–2.2)
ALP SERPL-CCNC: 97 U/L (ref 45–117)
ALT SERPL-CCNC: 23 U/L (ref 12–78)
ANION GAP SERPL CALC-SCNC: 6 MMOL/L (ref 2–12)
AST SERPL-CCNC: 16 U/L (ref 15–37)
BILIRUB SERPL-MCNC: 0.5 MG/DL (ref 0.2–1)
BUN SERPL-MCNC: 16 MG/DL (ref 6–20)
BUN/CREAT SERPL: 19 (ref 12–20)
CALCIUM SERPL-MCNC: 9.3 MG/DL (ref 8.5–10.1)
CHLORIDE SERPL-SCNC: 105 MMOL/L (ref 97–108)
CHOLEST SERPL-MCNC: 204 MG/DL
CO2 SERPL-SCNC: 27 MMOL/L (ref 21–32)
CREAT SERPL-MCNC: 0.84 MG/DL (ref 0.55–1.02)
EST. AVERAGE GLUCOSE BLD GHB EST-MCNC: 114 MG/DL
GLOBULIN SER CALC-MCNC: 3.3 G/DL (ref 2–4)
GLUCOSE SERPL-MCNC: 101 MG/DL (ref 65–100)
HBA1C MFR BLD: 5.6 % (ref 4–5.6)
HDLC SERPL-MCNC: 66 MG/DL
HDLC SERPL: 3.1 (ref 0–5)
LDLC SERPL CALC-MCNC: 124 MG/DL (ref 0–100)
POTASSIUM SERPL-SCNC: 4 MMOL/L (ref 3.5–5.1)
PROT SERPL-MCNC: 7.1 G/DL (ref 6.4–8.2)
SODIUM SERPL-SCNC: 138 MMOL/L (ref 136–145)
TRIGL SERPL-MCNC: 70 MG/DL
VLDLC SERPL CALC-MCNC: 14 MG/DL

## 2025-04-04 ENCOUNTER — RESULTS FOLLOW-UP (OUTPATIENT)
Facility: CLINIC | Age: 55
End: 2025-04-04

## 2025-04-30 DIAGNOSIS — E66.01 MORBID OBESITY (HCC): ICD-10-CM

## 2025-04-30 RX ORDER — PHENTERMINE HYDROCHLORIDE 37.5 MG/1
37.5 TABLET ORAL
Qty: 30 TABLET | Refills: 0 | Status: SHIPPED | OUTPATIENT
Start: 2025-04-30 | End: 2025-05-30

## 2025-04-30 NOTE — TELEPHONE ENCOUNTER
RX refill request from the patient/pharmacy. Patient last seen 04/01/2025 with labs, and next appt. scheduled for 050/13/2025.  \    Requested Prescriptions     Pending Prescriptions Disp Refills    phentermine (ADIPEX-P) 37.5 MG tablet 30 tablet 0     Sig: Take 1 tablet by mouth every morning (before breakfast) for 30 days. Max Daily Amount: 37.5 mg

## 2025-04-30 NOTE — TELEPHONE ENCOUNTER
Patient is requesting refill on her diet medication. Patient couldn't provide medication name. [facility] (on file). She is also requesting her recent lab results printed and left at the front for her  to  for her.  [phone]

## 2025-05-08 ENCOUNTER — TELEPHONE (OUTPATIENT)
Facility: CLINIC | Age: 55
End: 2025-05-08

## 2025-05-08 NOTE — TELEPHONE ENCOUNTER
Patient is requesting to  most recent lab results. She has an appointment today at another office and she needs those results ASAP. She first called 4/30/25 requesting to pick these up. Please advise. L[place] [phone].

## 2025-05-12 NOTE — PROGRESS NOTES
Chief Complaint   Patient presents with    Obesity       SUBJECTIVE:    Evi Juarez is a 54 y.o. female who returns in follow-up for her obesity as her weight on her last visit was 284 pounds which was down from initial weight of 293 pounds.  Had renewed her phentermine and she is actually joined the weight and wellness clinic at work at which time she also has been placed on Topamax and that was recent increase to 100 mg daily.  She is thus taking both the Topamax and phentermine.  She notes no chest pain, shortness of breath or cardiac Rister complaints.  There are no GI or  complaints and she notes no other complaints.      Current Outpatient Medications   Medication Sig Dispense Refill    topiramate (TOPAMAX) 100 MG tablet Take 1 tablet by mouth daily      phentermine (ADIPEX-P) 37.5 MG tablet Take 1 tablet by mouth every morning (before breakfast) for 30 days. Max Daily Amount: 37.5 mg 30 tablet 0    valsartan (DIOVAN) 80 MG tablet Take 1 tablet by mouth daily 90 tablet 1    Medium Chain Triglycerides (MCT OIL PO) Take by mouth      vitamin D 50 MCG (2000 UT) CAPS capsule Take 1 capsule by mouth daily      ascorbic acid (VITAMIN C) 250 MG tablet Take by mouth      cetirizine (ZYRTEC) 10 MG tablet Take by mouth      clobetasol (TEMOVATE) 0.05 % cream APPLY EXTERNALLY TO THE AFFECTED AREA TWICE DAILY AS NEEDED       No current facility-administered medications for this visit.     Past Medical History:   Diagnosis Date    Asthma     Depression     Diverticulitis 09/2018    Edema of extremities 7/27/2010    Hypertension     Incontinence 7/27/2010    Indigestion 7/27/2010    Joint pain 7/27/2010    Morbid obesity (HCC) 7/27/2010    Reflux 7/27/2010     Past Surgical History:   Procedure Laterality Date    EYE SURGERY  2008    [place]    HERNIA REPAIR  1970    Corrected as an infant    LAP, PLACE ADJUST GASTR BAND  12/4/08    ORTHOPEDIC SURGERY      trigger finger    [place] UNLISTED PROCEDURE ABDOMEN

## 2025-05-13 ENCOUNTER — OFFICE VISIT (OUTPATIENT)
Facility: CLINIC | Age: 55
End: 2025-05-13
Payer: COMMERCIAL

## 2025-05-13 VITALS
OXYGEN SATURATION: 99 % | BODY MASS INDEX: 43.27 KG/M2 | WEIGHT: 284.6 LBS | SYSTOLIC BLOOD PRESSURE: 138 MMHG | TEMPERATURE: 97.6 F | HEART RATE: 63 BPM | DIASTOLIC BLOOD PRESSURE: 86 MMHG

## 2025-05-13 DIAGNOSIS — E66.01 MORBID OBESITY (HCC): Primary | ICD-10-CM

## 2025-05-13 DIAGNOSIS — I10 ESSENTIAL HYPERTENSION: ICD-10-CM

## 2025-05-13 PROCEDURE — 3075F SYST BP GE 130 - 139MM HG: CPT | Performed by: INTERNAL MEDICINE

## 2025-05-13 PROCEDURE — 3079F DIAST BP 80-89 MM HG: CPT | Performed by: INTERNAL MEDICINE

## 2025-05-13 PROCEDURE — 99213 OFFICE O/P EST LOW 20 MIN: CPT | Performed by: INTERNAL MEDICINE

## 2025-05-13 RX ORDER — TOPIRAMATE 100 MG/1
100 TABLET, FILM COATED ORAL DAILY
COMMUNITY
Start: 2025-05-08

## 2025-05-13 NOTE — PROGRESS NOTES
Have you been to the ER, urgent care clinic since your last visit?  Hospitalized since your last visit?   NO    Have you seen or consulted any other health care providers outside our system since your last visit?   NO    Have you had a mammogram?”   NO    Date of last Mammogram: 8/19/2020      “Have you had a pap smear?”    NO    No cervical cancer screening on file

## 2025-07-08 ENCOUNTER — OFFICE VISIT (OUTPATIENT)
Facility: CLINIC | Age: 55
End: 2025-07-08
Payer: COMMERCIAL

## 2025-07-08 VITALS
DIASTOLIC BLOOD PRESSURE: 88 MMHG | WEIGHT: 286 LBS | BODY MASS INDEX: 43.35 KG/M2 | TEMPERATURE: 98.6 F | HEIGHT: 68 IN | HEART RATE: 68 BPM | SYSTOLIC BLOOD PRESSURE: 138 MMHG | OXYGEN SATURATION: 96 %

## 2025-07-08 DIAGNOSIS — L03.113 CELLULITIS OF RIGHT UPPER EXTREMITY: Primary | ICD-10-CM

## 2025-07-08 PROCEDURE — 3079F DIAST BP 80-89 MM HG: CPT | Performed by: INTERNAL MEDICINE

## 2025-07-08 PROCEDURE — 3075F SYST BP GE 130 - 139MM HG: CPT | Performed by: INTERNAL MEDICINE

## 2025-07-08 PROCEDURE — 99213 OFFICE O/P EST LOW 20 MIN: CPT | Performed by: INTERNAL MEDICINE

## 2025-07-08 RX ORDER — SULFAMETHOXAZOLE AND TRIMETHOPRIM 800; 160 MG/1; MG/1
1 TABLET ORAL 2 TIMES DAILY
Qty: 20 TABLET | Refills: 0 | Status: SHIPPED | OUTPATIENT
Start: 2025-07-08 | End: 2025-07-18

## 2025-07-08 ASSESSMENT — PATIENT HEALTH QUESTIONNAIRE - PHQ9
SUM OF ALL RESPONSES TO PHQ QUESTIONS 1-9: 0
1. LITTLE INTEREST OR PLEASURE IN DOING THINGS: NOT AT ALL
2. FEELING DOWN, DEPRESSED OR HOPELESS: NOT AT ALL
SUM OF ALL RESPONSES TO PHQ QUESTIONS 1-9: 0

## 2025-07-08 NOTE — PROGRESS NOTES
Evi Juarez is a 54 y.o. female     Chief Complaint   Patient presents with    Insect Bite     Lower right arm       \"Have you been to the ER, urgent care clinic since your last visit?  Hospitalized since your last visit?\"    NO    “Have you seen or consulted any other health care providers outside of [facility] since your last visit?”    [facility]       Have you had a mammogram?”   [facility]  1-    Date of last Mammogram: 8/19/2020      “Have you had a pap smear?”    [facility] 1-2025    No cervical cancer screening on file

## 2025-07-08 NOTE — PROGRESS NOTES
Subjective:   Evi Juarez is a 54 y.o. female      Chief Complaint   Patient presents with    Insect Bite     Lower right arm        History of present illness: She presents after noting an insect bite on her lower right arm/forearm area 2 days ago and she noted seem to be swollen and red yesterday and that area has increased today.  She has noted no fevers or chills.  She is not sure what type of insect it was.    Patient Active Problem List   Diagnosis    Diverticulosis    Joint pain    Mild intermittent asthma without complication    Incontinence    Gastroesophageal reflux disease without esophagitis    Left lower quadrant pain    Essential hypertension    Influenza    Cough due to ACE inhibitor    Dyslipidemia    Edema of extremities    Morbid obesity (HCC)    Primary osteoarthritis involving multiple joints    Colitis    Acute bronchitis    Acute non-recurrent maxillary sinusitis    Indigestion    Bacterial conjunctivitis    Depression    Fungal dermatitis      Past Medical History:   Diagnosis Date    Asthma     Depression     Diverticulitis 09/2018    Edema of extremities 7/27/2010    Hypertension     Incontinence 7/27/2010    Indigestion 7/27/2010    Joint pain 7/27/2010    Morbid obesity (HCC) 7/27/2010    Reflux 7/27/2010      Allergies   Allergen Reactions    Metronidazole Diarrhea    Otezla [Apremilast] Swelling    Lisinopril Cough      Family History   Problem Relation Age of Onset    Other Mother 68        aneurysm in her head    Hypertension Mother     Heart Disease Father     High Cholesterol Father       Social History     Socioeconomic History    Marital status:      Spouse name: Not on file    Number of children: Not on file    Years of education: Not on file    Highest education level: Not on file   Occupational History    Not on file   Tobacco Use    Smoking status: Never    Smokeless tobacco: Never   Vaping Use    Vaping status: Never Used   Substance and Sexual Activity    Alcohol

## 2025-07-09 ENCOUNTER — TELEPHONE (OUTPATIENT)
Facility: CLINIC | Age: 55
End: 2025-07-09

## 2025-07-09 RX ORDER — DOXYCYCLINE HYCLATE 100 MG
100 TABLET ORAL 2 TIMES DAILY
Qty: 20 TABLET | Refills: 0 | Status: SHIPPED | OUTPATIENT
Start: 2025-07-09 | End: 2025-07-19

## 2025-07-09 NOTE — TELEPHONE ENCOUNTER
RX refill request from the patient/pharmacy. Patient last seen 07- without labs, does not have a fup appointment.  Requested Prescriptions     Pending Prescriptions Disp Refills    doxycycline hyclate (VIBRA-TABS) 100 MG tablet 20 tablet 0     Sig: Take 1 tablet by mouth 2 times daily for 10 days

## 2025-07-09 NOTE — TELEPHONE ENCOUNTER
Patient states the medication   sulfamethoxazole-trimethoprim (BACTRIM DS;SEPTRA DS made her sick on her stomach, cold chills and shaky.   She did take the medication with food.  Patient requesting a different medication.  Pharmacy [facility].

## 2025-08-19 RX ORDER — VALSARTAN 80 MG/1
80 TABLET ORAL DAILY
Qty: 90 TABLET | Refills: 1 | Status: SHIPPED | OUTPATIENT
Start: 2025-08-19